# Patient Record
Sex: MALE | Race: WHITE | NOT HISPANIC OR LATINO | Employment: UNEMPLOYED | URBAN - NONMETROPOLITAN AREA
[De-identification: names, ages, dates, MRNs, and addresses within clinical notes are randomized per-mention and may not be internally consistent; named-entity substitution may affect disease eponyms.]

---
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
## 2024-02-16 ENCOUNTER — OFFICE VISIT (OUTPATIENT)
Age: 19
End: 2024-02-16
Payer: COMMERCIAL

## 2024-02-16 VITALS
TEMPERATURE: 97.5 F | WEIGHT: 230.7 LBS | HEART RATE: 73 BPM | OXYGEN SATURATION: 99 % | SYSTOLIC BLOOD PRESSURE: 130 MMHG | DIASTOLIC BLOOD PRESSURE: 90 MMHG | BODY MASS INDEX: 33.03 KG/M2 | HEIGHT: 70 IN

## 2024-02-16 DIAGNOSIS — Z23 IMMUNIZATION DUE: ICD-10-CM

## 2024-02-16 DIAGNOSIS — R03.0 ELEVATED BLOOD PRESSURE READING: ICD-10-CM

## 2024-02-16 DIAGNOSIS — F07.81 POST CONCUSSION SYNDROME: Primary | ICD-10-CM

## 2024-02-16 DIAGNOSIS — E66.09 CLASS 1 OBESITY DUE TO EXCESS CALORIES WITHOUT SERIOUS COMORBIDITY WITH BODY MASS INDEX (BMI) OF 32.0 TO 32.9 IN ADULT: ICD-10-CM

## 2024-02-16 PROBLEM — R51.9 HEADACHE: Status: ACTIVE | Noted: 2024-02-16

## 2024-02-16 PROBLEM — E66.811 CLASS 1 OBESITY DUE TO EXCESS CALORIES WITHOUT SERIOUS COMORBIDITY WITH BODY MASS INDEX (BMI) OF 32.0 TO 32.9 IN ADULT: Status: ACTIVE | Noted: 2024-02-16

## 2024-02-16 PROBLEM — R42 VERTIGO: Status: ACTIVE | Noted: 2022-11-22

## 2024-02-16 PROBLEM — M54.81 BILATERAL OCCIPITAL NEURALGIA: Status: ACTIVE | Noted: 2022-11-22

## 2024-02-16 PROBLEM — I95.1 PRIMARY ORTHOSTATIC HYPOTENSION: Status: ACTIVE | Noted: 2020-07-28

## 2024-02-16 PROBLEM — J45.901 ASTHMA EXACERBATION: Status: ACTIVE | Noted: 2024-02-16

## 2024-02-16 PROBLEM — M54.2 NECK PAIN: Status: ACTIVE | Noted: 2022-11-22

## 2024-02-16 PROBLEM — M54.50 ACUTE LUMBAR BACK PAIN: Status: ACTIVE | Noted: 2024-02-16

## 2024-02-16 PROBLEM — G43.909 MIGRAINE: Status: ACTIVE | Noted: 2022-11-22

## 2024-02-16 PROBLEM — F41.9 ANXIETY: Status: ACTIVE | Noted: 2020-07-28

## 2024-02-16 PROBLEM — G25.0 BENIGN ESSENTIAL TREMOR: Status: ACTIVE | Noted: 2020-07-27

## 2024-02-16 PROBLEM — R07.89 ATYPICAL CHEST PAIN: Status: ACTIVE | Noted: 2024-02-16

## 2024-02-16 RX ORDER — LORATADINE 10 MG/1
10 TABLET ORAL DAILY
COMMUNITY

## 2024-02-16 RX ORDER — DILTIAZEM HYDROCHLORIDE 60 MG/1
2 TABLET, FILM COATED ORAL
COMMUNITY
Start: 2023-11-02 | End: 2024-02-16

## 2024-02-16 RX ORDER — ALBUTEROL SULFATE 90 UG/1
AEROSOL, METERED RESPIRATORY (INHALATION) SEE ADMIN INSTRUCTIONS
COMMUNITY
Start: 2023-11-02 | End: 2024-02-16

## 2024-02-16 RX ORDER — AMITRIPTYLINE HYDROCHLORIDE 25 MG/1
25 TABLET, FILM COATED ORAL NIGHTLY
COMMUNITY
Start: 2023-11-14 | End: 2024-02-16

## 2024-02-16 RX ORDER — SUMATRIPTAN 100 MG/1
100 TABLET, FILM COATED ORAL
COMMUNITY
Start: 2023-11-14

## 2024-02-16 RX ORDER — FLUTICASONE PROPIONATE 50 MCG
SPRAY, SUSPENSION (ML) NASAL DAILY
COMMUNITY
End: 2024-02-16

## 2024-02-16 RX ORDER — DULOXETIN HYDROCHLORIDE 30 MG/1
30 CAPSULE, DELAYED RELEASE ORAL DAILY
COMMUNITY
Start: 2024-01-22 | End: 2025-01-21

## 2024-03-19 ENCOUNTER — OFFICE VISIT (OUTPATIENT)
Age: 19
End: 2024-03-19
Payer: COMMERCIAL

## 2024-03-19 VITALS
HEART RATE: 84 BPM | OXYGEN SATURATION: 97 % | TEMPERATURE: 97.5 F | SYSTOLIC BLOOD PRESSURE: 122 MMHG | DIASTOLIC BLOOD PRESSURE: 90 MMHG | BODY MASS INDEX: 32.37 KG/M2 | HEIGHT: 70 IN | WEIGHT: 226.1 LBS

## 2024-03-19 DIAGNOSIS — Z13.220 SCREENING FOR LIPID DISORDERS: ICD-10-CM

## 2024-03-19 DIAGNOSIS — Z11.59 NEED FOR HEPATITIS C SCREENING TEST: ICD-10-CM

## 2024-03-19 DIAGNOSIS — E55.9 VITAMIN D DEFICIENCY: ICD-10-CM

## 2024-03-19 DIAGNOSIS — Z13.0 SCREENING FOR DEFICIENCY ANEMIA: ICD-10-CM

## 2024-03-19 DIAGNOSIS — Z13.29 SCREENING FOR THYROID DISORDER: ICD-10-CM

## 2024-03-19 DIAGNOSIS — Z23 NEED FOR PNEUMOCOCCAL VACCINATION: ICD-10-CM

## 2024-03-19 DIAGNOSIS — E66.09 CLASS 1 OBESITY DUE TO EXCESS CALORIES WITHOUT SERIOUS COMORBIDITY WITH BODY MASS INDEX (BMI) OF 32.0 TO 32.9 IN ADULT: ICD-10-CM

## 2024-03-19 DIAGNOSIS — I10 PRIMARY HYPERTENSION: ICD-10-CM

## 2024-03-19 DIAGNOSIS — J45.40 MODERATE PERSISTENT ASTHMA WITHOUT COMPLICATION: ICD-10-CM

## 2024-03-19 DIAGNOSIS — Z00.00 WELLNESS EXAMINATION: Primary | ICD-10-CM

## 2024-03-19 DIAGNOSIS — R25.1 TREMOR: ICD-10-CM

## 2024-03-19 PROBLEM — J45.41 MODERATE PERSISTENT ASTHMA WITH EXACERBATION: Status: ACTIVE | Noted: 2024-02-16

## 2024-03-19 RX ORDER — DILTIAZEM HYDROCHLORIDE 60 MG/1
TABLET, FILM COATED ORAL
COMMUNITY
Start: 2024-03-15

## 2024-03-19 RX ORDER — PROPRANOLOL HYDROCHLORIDE 40 MG/1
40 TABLET ORAL 2 TIMES DAILY
Qty: 60 TABLET | Refills: 5 | Status: SHIPPED | OUTPATIENT
Start: 2024-03-19

## 2024-03-19 NOTE — PATIENT INSTRUCTIONS
Obesity, Adult  Obesity is the condition of having too much total body fat. Being overweight or obese means that your weight is greater than what is considered healthy for your body size. Obesity is determined by a measurement called BMI (body mass index). BMI is an estimate of body fat and is calculated from height and weight. For adults, a BMI of 30 or higher is considered obese.  Obesity can lead to other health concerns and major illnesses, including:  Stroke.  Coronary artery disease (CAD).  Type 2 diabetes.  Some types of cancer, including cancers of the colon, breast, uterus, and gallbladder.  High blood pressure (hypertension).  High cholesterol.  Gallbladder stones.  Obesity can also contribute to:  Osteoarthritis.  Sleep apnea.  Infertility problems.  What are the causes?  Common causes of this condition include:  Eating daily meals that are high in calories, sugar, and fat.  Drinking high amounts of sugar-sweetened beverages, such as soft drinks.  Being born with genes that may make you more likely to become obese.  Having a medical condition that causes obesity, including:  Hypothyroidism.  Polycystic ovarian syndrome (PCOS).  Binge-eating disorder.  Cushing syndrome.  Taking certain medicines, such as steroids, antidepressants, and seizure medicines.  Not being physically active (sedentary lifestyle).  Not getting enough sleep.  What increases the risk?  The following factors may make you more likely to develop this condition:  Having a family history of obesity.  Living in an area with limited access to:  [place], recreation centers, or sidewalks.  Healthy food choices, such as grocery stores and [employer] markets.  What are the signs or symptoms?  The main sign of this condition is having too much body fat.  How is this diagnosed?  This condition is diagnosed based on:  Your BMI. If you are an adult with a BMI of 30 or higher, you are considered obese.  Your waist circumference. This measures the  distance around your waistline.  Your skinfold thickness. Your health care provider may gently pinch a fold of your skin and measure it.  You may have other tests to check for underlying conditions.  How is this treated?  Treatment for this condition often includes changing your lifestyle. Treatment may include some or all of the following:  Dietary changes. This may include developing a healthy meal plan.  Regular physical activity. This may include activity that causes your heart to beat faster (aerobic exercise) and strength training. Work with your health care provider to design an exercise program that works for you.  Medicine to help you lose weight if you are unable to lose one pound a week after six weeks of healthy eating and more physical activity.  Treating conditions that cause the obesity (underlying conditions).  Surgery. Surgical options may include gastric banding and gastric bypass. Surgery may be done if:  Other treatments have not helped to improve your condition.  You have a BMI of 40 or higher.  You have life-threatening health problems related to obesity.  Follow these instructions at home:  Eating and drinking    Follow recommendations from your health care provider about what you eat and drink. Your health care provider may advise you to:  Limit fast food, sweets, and processed snack foods.  Choose low-fat options, such as low-fat milk instead of whole milk.  Eat five or more servings of fruits or vegetables every day.  Choose healthy foods when you eat out.  Keep low-fat snacks available.  Limit sugary drinks, such as soda, fruit juice, sweetened iced tea, and flavored milk.  Drink enough water to keep your urine pale yellow.  Do not follow a fad diet. Fad diets can be unhealthy and even dangerous.  Other healthful choices include:  Eat at home more often. This gives you more control over what you eat.  Learn to read food labels. This will help you understand how much food is considered one  serving.  Learn what a healthy serving size is.  Physical activity  Exercise regularly, as told by your health care provider.  Most adults should get up to 150 minutes of moderate-intensity exercise every week.  Ask your health care provider what types of exercise are safe for you and how often you should exercise.  Warm up and stretch before being active.  Cool down and stretch after being active.  Rest between periods of activity.  Lifestyle  Work with your health care provider and a dietitian to set a weight-loss goal that is healthy and reasonable for you.  Limit your screen time.  Find ways to reward yourself that do not involve food.  Do not drink alcohol if:  Your health care provider tells you not to drink.  You are pregnant, may be pregnant, or are planning to become pregnant.  If you drink alcohol:  Limit how much you have to:  0-1 drink a day for women.  0-2 drinks a day for men.  Know how much alcohol is in your drink. In the [place] one drink equals one 12 oz bottle of beer (355 mL), one 5 oz glass of wine (148 mL), or one 1½ oz glass of hard liquor (44 mL).  General instructions  Keep a weight-loss journal to keep track of the food you eat and how much exercise you get.  Take over-the-counter and prescription medicines only as told by your health care provider.  Take vitamins and supplements only as told by your health care provider.  Consider joining a support group. Your health care provider may be able to recommend a support group.  Pay attention to your mental health as obesity can lead to depression or self esteem issues.  Keep all follow-up visits. This is important.  Contact a health care provider if:  You are unable to meet your weight-loss goal after six weeks of dietary and lifestyle changes.  You have trouble breathing.  Summary  Obesity is the condition of having too much total body fat.  Being overweight or obese means that your weight is greater than what is considered healthy for your body  size.  Work with your health care provider and a dietitian to set a weight-loss goal that is healthy and reasonable for you.  Exercise regularly, as told by your health care provider. Ask your health care provider what types of exercise are safe for you and how often you should exercise.  This information is not intended to replace advice given to you by your health care provider. Make sure you discuss any questions you have with your health care provider.  Document Revised: 07/26/2022 Document Reviewed: 07/26/2022  [employer] Patient Education © 2023 [employer]  Managing Your Hypertension  Hypertension, also called high blood pressure, is when the force of the blood pressing against the walls of the arteries is too strong. Arteries are blood vessels that carry blood from your heart throughout your body. Hypertension forces the heart to work harder to pump blood and may cause the arteries to become narrow or stiff.  Understanding blood pressure readings  A blood pressure reading includes a higher number over a lower number:  The first, or top, number is called the systolic pressure. It is a measure of the pressure in your arteries as your heart beats.  The second, or bottom number, is called the diastolic pressure. It is a measure of the pressure in your arteries as the heart relaxes.  For most people, a normal blood pressure is below 120/80. Your personal target blood pressure may vary depending on your medical conditions, your age, and other factors.  Blood pressure is classified into four stages. Based on your blood pressure reading, your health care provider may use the following stages to determine what type of treatment you need, if any. Systolic pressure and diastolic pressure are measured in a unit called millimeters of mercury (mmHg).  Normal  Systolic pressure: below 120.  Diastolic pressure: below 80.  Elevated  Systolic pressure: 120-129.  Diastolic pressure: below 80.  Hypertension stage 1  Systolic  pressure: 130-139.  Diastolic pressure: 80-89.  Hypertension stage 2  Systolic pressure: 140 or above.  Diastolic pressure: 90 or above.  How can this condition affect me?  Managing your hypertension is very important. Over time, hypertension can damage the arteries and decrease blood flow to parts of the body, including the brain, heart, and kidneys. Having untreated or uncontrolled hypertension can lead to:  A heart attack.  A stroke.  A weakened blood vessel (aneurysm).  Heart failure.  Kidney damage.  Eye damage.  Memory and concentration problems.  Vascular dementia.  What actions can I take to manage this condition?  Hypertension can be managed by making lifestyle changes and possibly by taking medicines. Your health care provider will help you make a plan to bring your blood pressure within a normal range. You may be referred for counseling on a healthy diet and physical activity.  Nutrition    Eat a diet that is high in fiber and potassium, and low in salt (sodium), added sugar, and fat. An example eating plan is called the DASH diet. DASH stands for Dietary Approaches to Stop Hypertension. To eat this way:  Eat plenty of fresh fruits and vegetables. Try to fill one-half of your plate at each meal with fruits and vegetables.  Eat whole grains, such as whole-wheat pasta, brown rice, or whole-grain bread. Fill about one-fourth of your plate with whole grains.  Eat low-fat dairy products.  Avoid fatty cuts of meat, processed or cured meats, and poultry with skin. Fill about one-fourth of your plate with lean proteins such as fish, chicken without skin, beans, eggs, and tofu.  Avoid pre-made and processed foods. These tend to be higher in sodium, added sugar, and fat.  Reduce your daily sodium intake. Many people with hypertension should eat less than 1,500 mg of sodium a day.  Lifestyle    Work with your health care provider to maintain a healthy body weight or to lose weight. Ask what an ideal weight is for  you.  Get at least 30 minutes of exercise that causes your heart to beat faster (aerobic exercise) most days of the week. Activities may include walking, swimming, or biking.  Include exercise to strengthen your muscles (resistance exercise), such as weight lifting, as part of your weekly exercise routine. Try to do these types of exercises for 30 minutes at least 3 days a week.  Do not use any products that contain nicotine or tobacco. These products include cigarettes, chewing tobacco, and vaping devices, such as e-cigarettes. If you need help quitting, ask your health care provider.  Control any long-term (chronic) conditions you have, such as high cholesterol or diabetes.  Identify your sources of stress and find ways to manage stress. This may include meditation, deep breathing, or making time for fun activities.  Alcohol use  Do not drink alcohol if:  Your health care provider tells you not to drink.  You are pregnant, may be pregnant, or are planning to become pregnant.  If you drink alcohol:  Limit how much you have to:  0-1 drink a day for women.  0-2 drinks a day for men.  Know how much alcohol is in your drink. In the [place] one drink equals one 12 oz bottle of beer (355 mL), one 5 oz glass of wine (148 mL), or one 1½ oz glass of hard liquor (44 mL).  Medicines  Your health care provider may prescribe medicine if lifestyle changes are not enough to get your blood pressure under control and if:  Your systolic blood pressure is 130 or higher.  Your diastolic blood pressure is 80 or higher.  Take medicines only as told by your health care provider. Follow the directions carefully. Blood pressure medicines must be taken as told by your health care provider. The medicine does not work as well when you skip doses. Skipping doses also puts you at risk for problems.  Monitoring  Before you monitor your blood pressure:  Do not smoke, drink caffeinated beverages, or exercise within 30 minutes before taking a  measurement.  Use the bathroom and empty your bladder (urinate).  Sit quietly for at least 5 minutes before taking measurements.  Monitor your blood pressure at home as told by your health care provider. To do this:  Sit with your back straight and supported.  Place your feet flat on the floor. Do not cross your legs.  Support your arm on a flat surface, such as a table. Make sure your upper arm is at heart level.  Each time you measure, take two or three readings one minute apart and record the results.  You may also need to have your blood pressure checked regularly by your health care provider.  General information  Talk with your health care provider about your diet, exercise habits, and other lifestyle factors that may be contributing to hypertension.  Review all the medicines you take with your health care provider because there may be side effects or interactions.  Keep all follow-up visits. Your health care provider can help you create and adjust your plan for managing your high blood pressure.  Where to find more information  National Heart, Lung, and Blood [place]: www.nhlbi.nih.gov  American Heart Association: www.heart.org  Contact a health care provider if:  You think you are having a reaction to medicines you have taken.  You have repeated (recurrent) headaches.  You feel dizzy.  You have swelling in your ankles.  You have trouble with your vision.  Get help right away if:  You develop a severe headache or confusion.  You have unusual weakness or numbness, or you feel faint.  You have severe pain in your chest or abdomen.  You vomit repeatedly.  You have trouble breathing.  These symptoms may be an emergency. Get help right away. Call 911.  Do not wait to see if the symptoms will go away.  Do not drive yourself to the hospital.  Summary  Hypertension is when the force of blood pumping through your arteries is too strong. If this condition is not controlled, it may put you at risk for serious  "complications.  Your personal target blood pressure may vary depending on your medical conditions, your age, and other factors. For most people, a normal blood pressure is less than 120/80.  Hypertension is managed by lifestyle changes, medicines, or both.  Lifestyle changes to help manage hypertension include losing weight, eating a healthy, low-sodium diet, exercising more, stopping smoking, and limiting alcohol.  This information is not intended to replace advice given to you by your health care provider. Make sure you discuss any questions you have with your health care provider.  Document Revised: 09/01/2022 Document Reviewed: 09/01/2022  [employer] Patient Education © 2023 [employer]  DASH Eating Plan  DASH stands for Dietary Approaches to Stop Hypertension. The DASH eating plan is a healthy eating plan that has been shown to:  Reduce high blood pressure (hypertension).  Reduce your risk for type 2 diabetes, heart disease, and stroke.  Help with weight loss.  What are tips for following this plan?  Reading food labels  Check food labels for the amount of salt (sodium) per serving. Choose foods with less than 5 percent of the Daily Value of sodium. Generally, foods with less than 300 milligrams (mg) of sodium per serving fit into this eating plan.  To find whole grains, look for the word \"whole\" as the first word in the ingredient list.  Shopping  Buy products labeled as \"low-sodium\" or \"no salt added.\"  Buy fresh foods. Avoid canned foods and pre-made or frozen meals.  Cooking  Avoid adding salt when cooking. Use salt-free seasonings or herbs instead of table salt or sea salt. Check with your health care provider or pharmacist before using salt substitutes.  Do not santiago foods. Cook foods using healthy methods such as baking, boiling, grilling, roasting, and broiling instead.  Cook with heart-healthy oils, such as olive, canola, avocado, soybean, or sunflower oil.  Meal planning    Eat a balanced diet that " includes:  4 or more servings of fruits and 4 or more servings of vegetables each day. Try to fill one-half of your plate with fruits and vegetables.  6-8 servings of whole grains each day.  Less than 6 oz (170 g) of lean meat, poultry, or fish each day. A 3-oz (85-g) serving of meat is about the same size as a deck of cards. One egg equals 1 oz (28 g).  2-3 servings of low-fat dairy each day. One serving is 1 cup (237 mL).  1 serving of nuts, seeds, or beans 5 times each week.  2-3 servings of heart-healthy fats. Healthy fats called omega-3 fatty acids are found in foods such as walnuts, flaxseeds, fortified milks, and eggs. These fats are also found in cold-water fish, such as sardines, salmon, and mackerel.  Limit how much you eat of:  Canned or prepackaged foods.  Food that is high in trans fat, such as some fried foods.  Food that is high in saturated fat, such as fatty meat.  Desserts and other sweets, sugary drinks, and other foods with added sugar.  Full-fat dairy products.  Do not salt foods before eating.  Do not eat more than 4 egg yolks a week.  Try to eat at least 2 vegetarian meals a week.  Eat more home-cooked food and less restaurant, buffet, and fast food.  Lifestyle  When eating at a restaurant, ask that your food be prepared with less salt or no salt, if possible.  If you drink alcohol:  Limit how much you use to:  0-1 drink a day for women who are not pregnant.  0-2 drinks a day for men.  Be aware of how much alcohol is in your drink. In the [place] one drink equals one 12 oz bottle of beer (355 mL), one 5 oz glass of wine (148 mL), or one 1½ oz glass of hard liquor (44 mL).  General information  Avoid eating more than 2,300 mg of salt a day. If you have hypertension, you may need to reduce your sodium intake to 1,500 mg a day.  Work with your health care provider to maintain a healthy body weight or to lose weight. Ask what an ideal weight is for you.  Get at least 30 minutes of exercise that  causes your heart to beat faster (aerobic exercise) most days of the week. Activities may include walking, swimming, or biking.  Work with your health care provider or dietitian to adjust your eating plan to your individual calorie needs.  What foods should I eat?  Fruits  All fresh, dried, or frozen fruit. Canned fruit in natural juice (without added sugar).  Vegetables  Fresh or frozen vegetables (raw, steamed, roasted, or grilled). Low-sodium or reduced-sodium tomato and vegetable juice. Low-sodium or reduced-sodium tomato sauce and tomato paste. Low-sodium or reduced-sodium canned vegetables.  Grains  Whole-grain or whole-wheat bread. Whole-grain or whole-wheat pasta. Brown rice. Oatmeal. Quinoa. Bulgur. Whole-grain and low-sodium cereals. [place] bread. Low-fat, low-sodium crackers. Whole-wheat flour tortillas.  Meats and other proteins  Skinless chicken or turkey. Ground chicken or turkey. Pork with fat trimmed off. Fish and seafood. Egg whites. Dried beans, peas, or lentils. Unsalted nuts, nut butters, and seeds. Unsalted canned beans. Lean cuts of beef with fat trimmed off. Low-sodium, lean precooked or cured meat, such as sausages or meat loaves.  Dairy  Low-fat (1%) or fat-free (skim) milk. Reduced-fat, low-fat, or fat-free cheeses. Nonfat, low-sodium ricotta or cottage cheese. Low-fat or nonfat yogurt. Low-fat, low-sodium cheese.  Fats and oils  Soft margarine without trans fats. Vegetable oil. Reduced-fat, low-fat, or light mayonnaise and salad dressings (reduced-sodium). Canola, safflower, olive, avocado, soybean, and sunflower oils. Avocado.  Seasonings and condiments  Herbs. Spices. Seasoning mixes without salt.  Other foods  Unsalted popcorn and pretzels. Fat-free sweets.  The items listed above may not be a complete list of foods and beverages you can eat. Contact a dietitian for more information.  What foods should I avoid?  Fruits  Canned fruit in a light or heavy syrup. Fried fruit. Fruit in cream  or butter sauce.  Vegetables  Creamed or fried vegetables. Vegetables in a cheese sauce. Regular canned vegetables (not low-sodium or reduced-sodium). Regular canned tomato sauce and paste (not low-sodium or reduced-sodium). Regular tomato and vegetable juice (not low-sodium or reduced-sodium). Pickles. Olives.  Grains  Baked goods made with fat, such as croissants, muffins, or some breads. Dry pasta or rice meal packs.  Meats and other proteins  Fatty cuts of meat. Ribs. Fried meat. James. Bologna, salami, and other precooked or cured meats, such as sausages or meat loaves. Fat from the back of a pig (fatback). Bratwurst. Salted nuts and seeds. Canned beans with added salt. Canned or smoked fish. Whole eggs or egg yolks. Chicken or turkey with skin.  Dairy  Whole or 2% milk, cream, and half-and-half. Whole or full-fat cream cheese. Whole-fat or sweetened yogurt. Full-fat cheese. Nondairy creamers. Whipped toppings. Processed cheese and cheese spreads.  Fats and oils  Butter. Stick margarine. Lard. Shortening. Ghee. James fat. Tropical oils, such as coconut, palm kernel, or palm oil.  Seasonings and condiments  Onion salt, garlic salt, seasoned salt, table salt, and sea salt. [facility] sauce. Tartar sauce. Barbecue sauce. Teriyaki sauce. Soy sauce, including reduced-sodium. Steak sauce. Canned and packaged gravies. Fish sauce. Oyster sauce. Cocktail sauce. Store-bought horseradish. Ketchup. Mustard. Meat flavorings and tenderizers. Bouillon cubes. Hot sauces. Pre-made or packaged marinades. Pre-made or packaged taco seasonings. Relishes. Regular salad dressings.  Other foods  Salted popcorn and pretzels.  The items listed above may not be a complete list of foods and beverages you should avoid. Contact a dietitian for more information.  Where to find more information  National Heart, Lung, and Blood [place]: www.nhlbi.nih.gov  American Heart Association: www.heart.org  Academy of Nutrition and Dietetics:  www.eatright.org  National Kidney Foundation: www.kidney.org  Summary  The DASH eating plan is a healthy eating plan that has been shown to reduce high blood pressure (hypertension). It may also reduce your risk for type 2 diabetes, heart disease, and stroke.  When on the DASH eating plan, aim to eat more fresh fruits and vegetables, whole grains, lean proteins, low-fat dairy, and heart-healthy fats.  With the DASH eating plan, you should limit salt (sodium) intake to 2,300 mg a day. If you have hypertension, you may need to reduce your sodium intake to 1,500 mg a day.  Work with your health care provider or dietitian to adjust your eating plan to your individual calorie needs.  This information is not intended to replace advice given to you by your health care provider. Make sure you discuss any questions you have with your health care provider.  Document Revised: 11/20/2020 Document Reviewed: 11/20/2020  [employer] Patient Education © 2023 [employer]

## 2024-03-19 NOTE — PROGRESS NOTES
Patient Care Team:  Bandar Alva APRN as PCP - General (Family Medicine)     Chief complaint: Patient is in today for a physical     Patient is a 19 y.o. male who presents for his yearly physical exam.     HPI patient today for routine yearly physical exam and to follow-up on elevated blood pressure.  Last visit about a month ago his blood pressure was running 130/90.  We decided to try diet and exercise to see if can improve this blood pressure without medication at that time.  His weight is down 4 pounds from last visit.  He does report there is a strong family history of high blood pressure. His neurologist was interested in starting him on propranolol for HTN and tremor. He is scheduled for MRI in March 28.     Review of Systems   Constitutional:  Positive for fatigue. Negative for appetite change, chills and fever.   HENT:  Negative for congestion, ear pain, hearing loss, rhinorrhea, sinus pressure and sore throat.    Eyes:  Negative for itching and visual disturbance.   Respiratory:  Positive for shortness of breath and wheezing. Negative for cough.         Asthma   Cardiovascular:  Negative for chest pain, palpitations and leg swelling.   Gastrointestinal:  Negative for abdominal pain, blood in stool, constipation, diarrhea, nausea and vomiting.   Endocrine: Negative for cold intolerance, heat intolerance, polydipsia, polyphagia and polyuria.   Genitourinary:  Negative for difficulty urinating, dysuria and hematuria.   Musculoskeletal:  Positive for neck pain. Negative for arthralgias, back pain, joint swelling and myalgias.   Skin:  Negative for rash and wound.   Allergic/Immunologic: Negative for environmental allergies and food allergies.   Neurological:  Positive for tremors, light-headedness, numbness and headaches. Negative for dizziness.   Hematological:  Negative for adenopathy. Does not bruise/bleed easily.   Psychiatric/Behavioral:  Positive for sleep disturbance. Negative for dysphoric  mood. The patient is not nervous/anxious.       History  Past Medical History:   Diagnosis Date    Acid reflux     Allergic     Asthma     Migraines       Past Surgical History:   Procedure Laterality Date    HERNIA REPAIR        No Known Allergies   Family History   Problem Relation Age of Onset    Cancer Mother         thyroid and cervical    Obesity Mother      Social History     Socioeconomic History    Marital status: Single   Tobacco Use    Smoking status: Never    Smokeless tobacco: Never   Vaping Use    Vaping status: Never Used   Substance and Sexual Activity    Alcohol use: Never    Drug use: Never    Sexual activity: Never        Current Outpatient Medications:     DULoxetine (CYMBALTA) 30 MG capsule, Take 1 capsule by mouth Daily., Disp: , Rfl:     loratadine (CLARITIN) 10 MG tablet, Take 6 tablets by mouth Daily., Disp: , Rfl:     SUMAtriptan (IMITREX) 100 MG tablet, Take 1 tablet by mouth., Disp: , Rfl:     Symbicort 80-4.5 MCG/ACT inhaler, INHALE ONE PUFF BY MOUTH TWO TIMES A DAY, Disp: , Rfl:     propranolol (INDERAL) 40 MG tablet, Take 1 tablet by mouth 2 (Two) Times a Day., Disp: 60 tablet, Rfl: 5    Immunizations   N/A   Prescribed/Refused   Date     Td/Tdap  (Booster Q 10 yrs)   [x]           Prescribed    []     Refused        []           Flu  (Yearly)   [x]        Prescribed    []     Refused        []           Pneumonia      []        Prescribed    [x]     Refused        []                 Hep B     [x]        Prescribed    []     Refused        []           Shingles  (Age 50 and older)   [x]        Prescribed    []     Refused        []           Immunization History   Administered Date(s) Administered    31-influenza Vac Quardvalent Preservativ 09/23/2015, 10/10/2016    COVID-19 (PFIZER) Purple Cap Monovalent 07/02/2021    DTaP, Unspecified 03/13/2009    Fluzone (or Fluarix & Flulaval for VFC) >6mos 10/10/2016, 02/06/2018, 02/16/2024    Hep A, 2 Dose 10/17/2016, 02/06/2018    Hpv9  "10/17/2016, 02/06/2018    IPV 03/13/2009    Influenza LAIV (Nasal) 10/27/2011    Influenza Seasonal Injectable 03/13/2009    MCV4 Unspecified 07/06/2015    MMR 03/13/2009    Meningococcal MCV4P (Menactra) 04/16/2020    Pneumococcal Conjugate 20-Valent (PCV20) 03/19/2024    Tdap 07/06/2015    Varicella 07/06/2015     Health Maintenance   Topic Date Due    HEPATITIS C SCREENING  Never done    COVID-19 Vaccine (2 - 2023-24 season) 04/02/2024 (Originally 9/1/2023)    ANNUAL PHYSICAL  03/19/2025    BMI FOLLOWUP  03/19/2025    TDAP/TD VACCINES (2 - Td or Tdap) 07/06/2025    Pneumococcal Vaccine 0-64  Completed    INFLUENZA VACCINE  Completed    MENINGOCOCCAL VACCINE  Completed    HPV VACCINES  Completed        Diabetes  [] Yes  [x] No   N/A      Date     Eye Exam     []             []   Complete     []   Recommended Date:  Where:       Foot Exam     []         []   Complete          Obesity Counseling     []       []   Complete     No results found for: \"HGBA1C\", \"MICROALBUR\"    Additional Testing      Date     Colorectal Screening       [x]   N/A   []   Complete    []   Ordered     Date:    Where:       Pap      [x]   N/A   []   Complete   []   OB/GYN Date:    Where:       Mammogram        [x]   N/A   []   Complete   []  OB/GYN   []   Ordered Date:    Where:     PSA  (Over age 50)    [x]   N/A   []   Complete   []   Ordered Date:    Where:     US Aorta  (For male smokers, age 65)     [x]   N/A   []   Complete   []   Ordered Date:    Where:     CT for Smoker  (Age 55-75, 30 pk yr)    [x]   N/A   []   Complete   []   Ordered Date:    Where:     Bone Density/DEXA      [x]   N/A   []   Complete   []   Ordered Date:    Follow-up:     Hep. C        []   N/A   []   Complete   [x]   Ordered Date:    Where:   No results found for this or any previous visit.         /90 (BP Location: Right arm, Patient Position: Sitting, Cuff Size: Large Adult)   Pulse 84   Temp 97.5 °F (36.4 °C) (Temporal)   Ht 178.4 cm (70.24\")   Wt " 103 kg (226 lb 1.6 oz)   SpO2 97%   BMI 32.22 kg/m²       Physical Exam  Vitals and nursing note reviewed.   Constitutional:       General: He is not in acute distress.     Appearance: Normal appearance. He is well-developed. He is not diaphoretic.   HENT:      Head: Normocephalic and atraumatic.      Right Ear: External ear normal.      Left Ear: External ear normal.      Nose: Nose normal.   Eyes:      General: No scleral icterus.        Right eye: No discharge.         Left eye: No discharge.      Conjunctiva/sclera: Conjunctivae normal.      Pupils: Pupils are equal, round, and reactive to light.   Neck:      Thyroid: No thyromegaly.      Vascular: No JVD (no bruits).      Trachea: No tracheal deviation.   Cardiovascular:      Rate and Rhythm: Normal rate and regular rhythm.      Heart sounds: No murmur heard.     No friction rub. No gallop.   Pulmonary:      Effort: Pulmonary effort is normal. No respiratory distress.      Breath sounds: Normal breath sounds. No wheezing or rales.   Chest:      Chest wall: No tenderness.   Abdominal:      General: Bowel sounds are normal. There is no distension.      Palpations: Abdomen is soft. There is no mass.      Tenderness: There is no abdominal tenderness. There is no guarding or rebound.      Hernia: No hernia is present.   Genitourinary:     Comments: deferred  Musculoskeletal:         General: No tenderness or deformity. Normal range of motion.      Cervical back: Normal range of motion and neck supple.   Lymphadenopathy:      Cervical: No cervical adenopathy.   Skin:     General: Skin is warm and dry.      Coloration: Skin is not pale.      Findings: No erythema or rash.   Neurological:      Mental Status: He is alert and oriented to person, place, and time.      Motor: No abnormal muscle tone.      Deep Tendon Reflexes: Reflexes are normal and symmetric. Reflexes normal.   Psychiatric:         Behavior: Behavior normal.         Thought Content: Thought content  normal.         Judgment: Judgment normal.             Counseling provided on weight management and hypertension.    Diagnoses and all orders for this visit:    Wellness examination  -     Comprehensive Metabolic Panel; Future  -     Lipid Panel; Future  -     TSH Rfx On Abnormal To Free T4; Future  -     CBC (No Diff); Future  -     Hepatitis C Antibody; Future    Class 1 obesity due to excess calories without serious comorbidity with body mass index (BMI) of 32.0 to 32.9 in adult  -     Comprehensive Metabolic Panel; Future    Primary hypertension  -     Comprehensive Metabolic Panel; Future  -     propranolol (INDERAL) 40 MG tablet; Take 1 tablet by mouth 2 (Two) Times a Day.    Screening for lipid disorders  -     Lipid Panel; Future    Vitamin D deficiency  -     Vitamin D,25-Hydroxy; Future    Screening for thyroid disorder  -     TSH Rfx On Abnormal To Free T4; Future    Screening for deficiency anemia  -     CBC (No Diff); Future    Need for hepatitis C screening test  -     Hepatitis C Antibody; Future    Tremor  -     propranolol (INDERAL) 40 MG tablet; Take 1 tablet by mouth 2 (Two) Times a Day.    Need for pneumococcal vaccination  -     Pneumococcal Conjugate Vaccine 20-Valent All    Moderate persistent asthma without complication  -     Symbicort 80-4.5 MCG/ACT inhaler; INHALE ONE PUFF BY MOUTH TWO TIMES A DAY    This is a health maintenance visit, for a patient age under age 65. Counseling/Anticipatory Guidance for nutrition, family planning/contraception, physical activity, healthy weight, and injury prevention, misuse of tobacco, alcohol and drugs, sexual behavior and STDs, dental health, mental health, immunizations, screenings For Women: Breast cancer and exams. For Men: testicular exams. All discussed.      Patient instructed to check blood pressure daily, record, and bring to next visit. If the readings run 140/90 or greater, the patient is to call my office or return sooner for evaluation. Pt  advised to eat a heart healthy diet and get regular aerobic exercise.    Will obtain routine labs today and make further recommendations pending results. All lab results are automatically released to [facility] immediately when they return whether they have been reviewed or not. The patient will be notified about the results, regardless of the findings. If they have not been contacted by the office within 2 weeks after the test has been performed, I want them to contact us to learn about the results.    Discussed need for weight management.  I recommend they use a weight loss jose david on their phone, eat no more than [id number] earline/day, and exercise 30 to 60 minutes 3 times a week.  Discussed weight loss with diet, recommend Weight Watchers or Mediterranean Diet, but stated that whatever method works for this patient is best. The patient agrees with all recommendations at this time. I have discussed a weight loss plan to be determined by this patient.     RV- 1 month      Bandar Alva, APRN   3/19/2024   16:05 EDT          Please note that portions of this document were completed with a voice recognition program.     At [facility], we believe that sharing information builds trust and better relationships. You are receiving this note because you are receiving care at [facility] or have recently visited. It is possible you will see health information before a provider has talked with you about it. This kind of information can be easy to misunderstand. To help you fully understand what it means for your health, we urge you to discuss this note with your provider.

## 2024-03-19 NOTE — LETTER
[facility]  Vaccine Consent Form    Patient Name:  Polo Mcguire  Patient :  2005     Vaccine(s) Ordered    Pneumococcal Conjugate Vaccine 20-Valent All        Screening Checklist  The following questions should be completed prior to vaccination. If you answer “yes” to any question, it does not necessarily mean you should not be vaccinated. It just means we may need to clarify or ask more questions. If a question is unclear, please ask your healthcare provider to explain it.    Yes No   Any fever or moderate to severe illness today (mild illness and/or antibiotic treatment are not contraindications)?     Do you have a history of a serious reaction to any previous vaccinations, such as anaphylaxis, encephalopathy within 7 days, Guillain-Salem syndrome within 6 weeks, seizure?     Have you received any live vaccine(s) (e.g MMR, PEREZ) or any other vaccines in the last month (to ensure duplicate doses aren't given)?     Do you have an anaphylactic allergy to latex (DTaP, DTaP-IPV, Hep A, Hep B, MenB, RV, Td, Tdap), baker’s yeast (Hep B, HPV), polysorbates (RSV, nirsevimab, PCV 20, Rotavirrus, Tdap, Shingrix), or gelatin (PEREZ, MMR)?     Do you have an anaphylactic allergy to neomycin (Rabies, PEREZ, MMR, IPV, Hep A), polymyxin B (IPV), or streptomycin (IPV)?      Any cancer, leukemia, AIDS, or other immune system disorder? (PEREZ, MMR, RV)     Do you have a parent, brother, or sister with an immune system problem (if immune competence of vaccine recipient clinically verified, can proceed)? (MMR, PEREZ)     Any recent steroid treatments for >2 weeks, chemotherapy, or radiation treatment? (PEREZ, MMR)     Have you received antibody-containing blood transfusions or IVIG in the past 11 months (recommended interval is dependent on product)? (MMR, PEREZ)     Have you taken antiviral drugs (acyclovir, famciclovir, valacyclovir for PEREZ) in the last 24 or 48 hours, respectively?      Are you pregnant or planning to become  "pregnant within 1 month? (PEREZ, MMR, HPV, IPV, MenB, Abrexvy; For Hep B- refer to Engerix-B; For RSV - Abrysvo is indicated for 32-36 weeks of pregnancy from September to January)     For infants, have you ever been told your child has had intussusception or a medical emergency involving obstruction of the intestine (Rotavirus)? If not for an infant, can skip this question.         *Ordering Physicians/APC should be consulted if \"yes\" is checked by the patient or guardian above.  I have received, read, and understand the Vaccine Information Statement (VIS) for each vaccine ordered.  I have considered my or my child's health status as well as the health status of my close contacts.  I have taken the opportunity to discuss my vaccine questions with my or my child's health care provider.   I have requested that the ordered vaccine(s) be given to me or my child.  I understand the benefits and risks of the vaccines.  I understand that I should remain in the clinic for 15 minutes after receiving the vaccine(s).  _________________________________________________________  Signature of Patient or Parent/Legal Guardian ____________________  Date     "

## 2024-04-23 ENCOUNTER — OFFICE VISIT (OUTPATIENT)
Age: 19
End: 2024-04-23
Payer: COMMERCIAL

## 2024-04-23 VITALS
HEIGHT: 70 IN | OXYGEN SATURATION: 98 % | BODY MASS INDEX: 33.46 KG/M2 | WEIGHT: 233.7 LBS | DIASTOLIC BLOOD PRESSURE: 88 MMHG | TEMPERATURE: 97.5 F | SYSTOLIC BLOOD PRESSURE: 128 MMHG | HEART RATE: 82 BPM

## 2024-04-23 DIAGNOSIS — J30.2 SEASONAL ALLERGIC RHINITIS, UNSPECIFIED TRIGGER: ICD-10-CM

## 2024-04-23 DIAGNOSIS — E66.09 CLASS 1 OBESITY DUE TO EXCESS CALORIES WITHOUT SERIOUS COMORBIDITY WITH BODY MASS INDEX (BMI) OF 32.0 TO 32.9 IN ADULT: ICD-10-CM

## 2024-04-23 DIAGNOSIS — F41.9 ANXIETY: ICD-10-CM

## 2024-04-23 DIAGNOSIS — F07.81 POST CONCUSSION SYNDROME: ICD-10-CM

## 2024-04-23 DIAGNOSIS — R51.9 NONINTRACTABLE HEADACHE, UNSPECIFIED CHRONICITY PATTERN, UNSPECIFIED HEADACHE TYPE: Primary | ICD-10-CM

## 2024-04-23 DIAGNOSIS — I10 PRIMARY HYPERTENSION: ICD-10-CM

## 2024-04-23 DIAGNOSIS — J45.40 MODERATE PERSISTENT ASTHMA WITHOUT COMPLICATION: ICD-10-CM

## 2024-04-23 PROCEDURE — 1159F MED LIST DOCD IN RCRD: CPT | Performed by: NURSE PRACTITIONER

## 2024-04-23 PROCEDURE — 3074F SYST BP LT 130 MM HG: CPT | Performed by: NURSE PRACTITIONER

## 2024-04-23 PROCEDURE — 99214 OFFICE O/P EST MOD 30 MIN: CPT | Performed by: NURSE PRACTITIONER

## 2024-04-23 PROCEDURE — 3079F DIAST BP 80-89 MM HG: CPT | Performed by: NURSE PRACTITIONER

## 2024-04-23 PROCEDURE — 1160F RVW MEDS BY RX/DR IN RCRD: CPT | Performed by: NURSE PRACTITIONER

## 2024-04-23 RX ORDER — LORATADINE 10 MG/1
10 TABLET ORAL DAILY
Qty: 90 TABLET | Refills: 3 | Status: SHIPPED | OUTPATIENT
Start: 2024-04-23

## 2024-04-23 RX ORDER — FLUTICASONE PROPIONATE 50 MCG
2 SPRAY, SUSPENSION (ML) NASAL DAILY
Qty: 18.2 ML | Refills: 11 | Status: SHIPPED | OUTPATIENT
Start: 2024-04-23

## 2024-04-23 RX ORDER — ERENUMAB-AOOE 70 MG/ML
70 INJECTION SUBCUTANEOUS
COMMUNITY
Start: 2024-04-15 | End: 2025-04-15

## 2024-04-23 RX ORDER — MONTELUKAST SODIUM 10 MG/1
10 TABLET ORAL NIGHTLY
Qty: 90 TABLET | Refills: 1 | Status: SHIPPED | OUTPATIENT
Start: 2024-04-23

## 2024-04-23 NOTE — PATIENT INSTRUCTIONS
Managing Your Hypertension  Hypertension, also called high blood pressure, is when the force of the blood pressing against the walls of the arteries is too strong. Arteries are blood vessels that carry blood from your heart throughout your body. Hypertension forces the heart to work harder to pump blood and may cause the arteries to become narrow or stiff.  Understanding blood pressure readings  A blood pressure reading includes a higher number over a lower number:  The first, or top, number is called the systolic pressure. It is a measure of the pressure in your arteries as your heart beats.  The second, or bottom number, is called the diastolic pressure. It is a measure of the pressure in your arteries as the heart relaxes.  For most people, a normal blood pressure is below 120/80. Your personal target blood pressure may vary depending on your medical conditions, your age, and other factors.  Blood pressure is classified into four stages. Based on your blood pressure reading, your health care provider may use the following stages to determine what type of treatment you need, if any. Systolic pressure and diastolic pressure are measured in a unit called millimeters of mercury (mmHg).  Normal  Systolic pressure: below 120.  Diastolic pressure: below 80.  Elevated  Systolic pressure: 120-129.  Diastolic pressure: below 80.  Hypertension stage 1  Systolic pressure: 130-139.  Diastolic pressure: 80-89.  Hypertension stage 2  Systolic pressure: 140 or above.  Diastolic pressure: 90 or above.  How can this condition affect me?  Managing your hypertension is very important. Over time, hypertension can damage the arteries and decrease blood flow to parts of the body, including the brain, heart, and kidneys. Having untreated or uncontrolled hypertension can lead to:  A heart attack.  A stroke.  A weakened blood vessel (aneurysm).  Heart failure.  Kidney damage.  Eye damage.  Memory and concentration problems.  Vascular  dementia.  What actions can I take to manage this condition?  Hypertension can be managed by making lifestyle changes and possibly by taking medicines. Your health care provider will help you make a plan to bring your blood pressure within a normal range. You may be referred for counseling on a healthy diet and physical activity.  Nutrition    Eat a diet that is high in fiber and potassium, and low in salt (sodium), added sugar, and fat. An example eating plan is called the DASH diet. DASH stands for Dietary Approaches to Stop Hypertension. To eat this way:  Eat plenty of fresh fruits and vegetables. Try to fill one-half of your plate at each meal with fruits and vegetables.  Eat whole grains, such as whole-wheat pasta, brown rice, or whole-grain bread. Fill about one-fourth of your plate with whole grains.  Eat low-fat dairy products.  Avoid fatty cuts of meat, processed or cured meats, and poultry with skin. Fill about one-fourth of your plate with lean proteins such as fish, chicken without skin, beans, eggs, and tofu.  Avoid pre-made and processed foods. These tend to be higher in sodium, added sugar, and fat.  Reduce your daily sodium intake. Many people with hypertension should eat less than 1,500 mg of sodium a day.  Lifestyle    Work with your health care provider to maintain a healthy body weight or to lose weight. Ask what an ideal weight is for you.  Get at least 30 minutes of exercise that causes your heart to beat faster (aerobic exercise) most days of the week. Activities may include walking, swimming, or biking.  Include exercise to strengthen your muscles (resistance exercise), such as weight lifting, as part of your weekly exercise routine. Try to do these types of exercises for 30 minutes at least 3 days a week.  Do not use any products that contain nicotine or tobacco. These products include cigarettes, chewing tobacco, and vaping devices, such as e-cigarettes. If you need help quitting, ask your  health care provider.  Control any long-term (chronic) conditions you have, such as high cholesterol or diabetes.  Identify your sources of stress and find ways to manage stress. This may include meditation, deep breathing, or making time for fun activities.  Alcohol use  Do not drink alcohol if:  Your health care provider tells you not to drink.  You are pregnant, may be pregnant, or are planning to become pregnant.  If you drink alcohol:  Limit how much you have to:  0-1 drink a day for women.  0-2 drinks a day for men.  Know how much alcohol is in your drink. In the [place] one drink equals one 12 oz bottle of beer (355 mL), one 5 oz glass of wine (148 mL), or one 1½ oz glass of hard liquor (44 mL).  Medicines  Your health care provider may prescribe medicine if lifestyle changes are not enough to get your blood pressure under control and if:  Your systolic blood pressure is 130 or higher.  Your diastolic blood pressure is 80 or higher.  Take medicines only as told by your health care provider. Follow the directions carefully. Blood pressure medicines must be taken as told by your health care provider. The medicine does not work as well when you skip doses. Skipping doses also puts you at risk for problems.  Monitoring  Before you monitor your blood pressure:  Do not smoke, drink caffeinated beverages, or exercise within 30 minutes before taking a measurement.  Use the bathroom and empty your bladder (urinate).  Sit quietly for at least 5 minutes before taking measurements.  Monitor your blood pressure at home as told by your health care provider. To do this:  Sit with your back straight and supported.  Place your feet flat on the floor. Do not cross your legs.  Support your arm on a flat surface, such as a table. Make sure your upper arm is at heart level.  Each time you measure, take two or three readings one minute apart and record the results.  You may also need to have your blood pressure checked regularly by  your health care provider.  General information  Talk with your health care provider about your diet, exercise habits, and other lifestyle factors that may be contributing to hypertension.  Review all the medicines you take with your health care provider because there may be side effects or interactions.  Keep all follow-up visits. Your health care provider can help you create and adjust your plan for managing your high blood pressure.  Where to find more information  National Heart, Lung, and Blood [place]: www.nhlbi.nih.gov  American Heart Association: www.heart.org  Contact a health care provider if:  You think you are having a reaction to medicines you have taken.  You have repeated (recurrent) headaches.  You feel dizzy.  You have swelling in your ankles.  You have trouble with your vision.  Get help right away if:  You develop a severe headache or confusion.  You have unusual weakness or numbness, or you feel faint.  You have severe pain in your chest or abdomen.  You vomit repeatedly.  You have trouble breathing.  These symptoms may be an emergency. Get help right away. Call 911.  Do not wait to see if the symptoms will go away.  Do not drive yourself to the hospital.  Summary  Hypertension is when the force of blood pumping through your arteries is too strong. If this condition is not controlled, it may put you at risk for serious complications.  Your personal target blood pressure may vary depending on your medical conditions, your age, and other factors. For most people, a normal blood pressure is less than 120/80.  Hypertension is managed by lifestyle changes, medicines, or both.  Lifestyle changes to help manage hypertension include losing weight, eating a healthy, low-sodium diet, exercising more, stopping smoking, and limiting alcohol.  This information is not intended to replace advice given to you by your health care provider. Make sure you discuss any questions you have with your health care  "provider.  Document Revised: 09/01/2022 Document Reviewed: 09/01/2022  [employer] Patient Education © 2024 [employer]  DASH Eating Plan  DASH stands for Dietary Approaches to Stop Hypertension. The DASH eating plan is a healthy eating plan that has been shown to:  Lower high blood pressure (hypertension).  Reduce your risk for type 2 diabetes, heart disease, and stroke.  Help with weight loss.  What are tips for following this plan?  Reading food labels  Check food labels for the amount of salt (sodium) per serving. Choose foods with less than 5 percent of the Daily Value (DV) of sodium. In general, foods with less than 300 milligrams (mg) of sodium per serving fit into this eating plan.  To find whole grains, look for the word \"whole\" as the first word in the ingredient list.  Shopping  Buy products labeled as \"low-sodium\" or \"no salt added.\"  Buy fresh foods. Avoid canned foods and pre-made or frozen meals.  Cooking  Try not to add salt when you cook. Use salt-free seasonings or herbs instead of table salt or sea salt. Check with your health care provider or pharmacist before using salt substitutes.  Do not [place]antiago foods. Cook foods in healthy ways, such as baking, boiling, grilling, roasting, or broiling.  Cook using oils that are good for your heart. These include olive, canola, avocado, soybean, and sunflower oil.  Meal planning    Eat a balanced diet. This should include:  4 or more servings of fruits and 4 or more servings of vegetables each day. Try to fill half of your plate with fruits and vegetables.  6-8 servings of whole grains each day.  6 or less servings of lean meat, poultry, or fish each day. 1 oz is 1 serving. A 3 oz (85 g) serving of meat is about the same size as the palm of your hand. One egg is 1 oz (28 g).  2-3 servings of low-fat dairy each day. One serving is 1 cup (237 mL).  1 serving of nuts, seeds, or beans 5 times each week.  2-3 servings of heart-healthy fats. Healthy fats called omega-3 " fatty acids are found in foods such as walnuts, flaxseeds, fortified milks, and eggs. These fats are also found in cold-water fish, such as sardines, salmon, and mackerel.  Limit how much you eat of:  Canned or prepackaged foods.  Food that is high in trans fat, such as fried foods.  Food that is high in saturated fat, such as fatty meat.  Desserts and other sweets, sugary drinks, and other foods with added sugar.  Full-fat dairy products.  Do not salt foods before eating.  Do not eat more than 4 egg yolks a week.  Try to eat at least 2 vegetarian meals a week.  Eat more home-cooked food and less restaurant, buffet, and fast food.  Lifestyle  When eating at a restaurant, ask if your food can be made with less salt or no salt.  If you drink alcohol:  Limit how much you have to:  0-1 drink a day if you are female.  0-2 drinks a day if you are male.  Know how much alcohol is in your drink. In the [place] one drink is one 12 oz bottle of beer (355 mL), one 5 oz glass of wine (148 mL), or one 1½ oz glass of hard liquor (44 mL).  General information  Avoid eating more than 2,300 mg of salt a day. If you have hypertension, you may need to reduce your sodium intake to 1,500 mg a day.  Work with your provider to stay at a healthy body weight or lose weight. Ask what the best weight range is for you.  On most days of the week, get at least 30 minutes of exercise that causes your heart to beat faster. This may include walking, swimming, or biking.  Work with your provider or dietitian to adjust your eating plan to meet your specific calorie needs.  What foods should I eat?  Fruits  All fresh, dried, or frozen fruit. Canned fruits that are in their natural juice and do not have sugar added to them.  Vegetables  Fresh or frozen vegetables that are raw, steamed, roasted, or grilled. Low-sodium or reduced-sodium tomato and vegetable juice. Low-sodium or reduced-sodium tomato sauce and tomato paste. Low-sodium or reduced-sodium  canned vegetables.  Grains  Whole-grain or whole-wheat bread. Whole-grain or whole-wheat pasta. Brown rice. Oatmeal. Quinoa. Bulgur. Whole-grain and low-sodium cereals. [place] bread. Low-fat, low-sodium crackers. Whole-wheat flour tortillas.  Meats and other proteins  Skinless chicken or turkey. Ground chicken or turkey. Pork with fat trimmed off. Fish and seafood. Egg whites. Dried beans, peas, or lentils. Unsalted nuts, nut butters, and seeds. Unsalted canned beans. Lean cuts of beef with fat trimmed off. Low-sodium, lean precooked or cured meat, such as sausages or meat loaves.  Dairy  Low-fat (1%) or fat-free (skim) milk. Reduced-fat, low-fat, or fat-free cheeses. Nonfat, low-sodium ricotta or cottage cheese. Low-fat or nonfat yogurt. Low-fat, low-sodium cheese.  Fats and oils  Soft margarine without trans fats. Vegetable oil. Reduced-fat, low-fat, or light mayonnaise and salad dressings (reduced-sodium). Canola, safflower, olive, avocado, soybean, and sunflower oils. Avocado.  Seasonings and condiments  Herbs. Spices. Seasoning mixes without salt.  Other foods  Unsalted popcorn and pretzels. Fat-free sweets.  The items listed above may not be all the foods and drinks you can have. Talk to a dietitian to learn more.  What foods should I avoid?  Fruits  Canned fruit in a light or heavy syrup. Fried fruit. Fruit in cream or butter sauce.  Vegetables  Creamed or fried vegetables. Vegetables in a cheese sauce. Regular canned vegetables that are not marked as low-sodium or reduced-sodium. Regular canned tomato sauce and paste that are not marked as low-sodium or reduced-sodium. Regular tomato and vegetable juices that are not marked as low-sodium or reduced-sodium. Pickles. Olives.  Grains  Baked goods made with fat, such as croissants, muffins, or some breads. Dry pasta or rice meal packs.  Meats and other proteins  Fatty cuts of meat. Ribs. Fried meat. James. Bologna, salami, and other precooked or cured meats, such  as sausages or meat loaves, that are not lean and low in sodium. Fat from the back of a pig (fatback). Bratwurst. Salted nuts and seeds. Canned beans with added salt. Canned or smoked fish. Whole eggs or egg yolks. Chicken or turkey with skin.  Dairy  Whole or 2% milk, cream, and half-and-half. Whole or full-fat cream cheese. Whole-fat or sweetened yogurt. Full-fat cheese. Nondairy creamers. Whipped toppings. Processed cheese and cheese spreads.  Fats and oils  Butter. Stick margarine. Lard. Shortening. Ghee. James fat. Tropical oils, such as coconut, palm kernel, or palm oil.  Seasonings and condiments  Onion salt, garlic salt, seasoned salt, table salt, and sea salt. Worcestershire sauce. Tartar sauce. Barbecue sauce. Teriyaki sauce. Soy sauce, including reduced-sodium soy sauce. Steak sauce. Canned and packaged gravies. Fish sauce. Oyster sauce. Cocktail sauce. Store-bought horseradish. Ketchup. Mustard. Meat flavorings and tenderizers. Bouillon cubes. Hot sauces. Pre-made or packaged marinades. Pre-made or packaged taco seasonings. Relishes. Regular salad dressings.  Other foods  Salted popcorn and pretzels.  The items listed above may not be all the foods and drinks you should avoid. Talk to a dietitian to learn more.  Where to find more information  National Heart, Lung, and Blood [place] (NHLBI): nhlbi.nih.gov  American Heart Association (AHA): heart.org  Academy of Nutrition and Dietetics: eatright.org  National Kidney Foundation (NKF): kidney.org  This information is not intended to replace advice given to you by your health care provider. Make sure you discuss any questions you have with your health care provider.  Document Revised: 01/04/2024 Document Reviewed: 01/04/2024  [employer] Patient Education © 2024 [employer]  Obesity, Adult  Obesity is the condition of having too much total body fat. Being overweight or obese means that your weight is greater than what is considered healthy for your body  size. Obesity is determined by a measurement called BMI (body mass index). BMI is an estimate of body fat and is calculated from height and weight. For adults, a BMI of 30 or higher is considered obese.  Obesity can lead to other health concerns and major illnesses, including:  Stroke.  Coronary artery disease (CAD).  Type 2 diabetes.  Some types of cancer, including cancers of the colon, breast, uterus, and gallbladder.  High blood pressure (hypertension).  High cholesterol.  Gallbladder stones.  Obesity can also contribute to:  Osteoarthritis.  Sleep apnea.  Infertility problems.  What are the causes?  Common causes of this condition include:  Eating daily meals that are high in calories, sugar, and fat.  Drinking high amounts of sugar-sweetened beverages, such as soft drinks.  Being born with genes that may make you more likely to become obese.  Having a medical condition that causes obesity, including:  Hypothyroidism.  Polycystic ovarian syndrome (PCOS).  Binge-eating disorder.  Cushing syndrome.  Taking certain medicines, such as steroids, antidepressants, and seizure medicines.  Not being physically active (sedentary lifestyle).  Not getting enough sleep.  What increases the risk?  The following factors may make you more likely to develop this condition:  Having a family history of obesity.  Living in an area with limited access to:  [place], recreation centers, or sidewalks.  Healthy food choices, such as grocery stores and [employer] markets.  What are the signs or symptoms?  The main sign of this condition is having too much body fat.  How is this diagnosed?  This condition is diagnosed based on:  Your BMI. If you are an adult with a BMI of 30 or higher, you are considered obese.  Your waist circumference. This measures the distance around your waistline.  Your skinfold thickness. Your health care provider may gently pinch a fold of your skin and measure it.  You may have other tests to check for underlying  conditions.  How is this treated?  Treatment for this condition often includes changing your lifestyle. Treatment may include some or all of the following:  Dietary changes. This may include developing a healthy meal plan.  Regular physical activity. This may include activity that causes your heart to beat faster (aerobic exercise) and strength training. Work with your health care provider to design an exercise program that works for you.  Medicine to help you lose weight if you are unable to lose one pound a week after six weeks of healthy eating and more physical activity.  Treating conditions that cause the obesity (underlying conditions).  Surgery. Surgical options may include gastric banding and gastric bypass. Surgery may be done if:  Other treatments have not helped to improve your condition.  You have a BMI of 40 or higher.  You have life-threatening health problems related to obesity.  Follow these instructions at home:  Eating and drinking    Follow recommendations from your health care provider about what you eat and drink. Your health care provider may advise you to:  Limit fast food, sweets, and processed snack foods.  Choose low-fat options, such as low-fat milk instead of whole milk.  Eat five or more servings of fruits or vegetables every day.  Choose healthy foods when you eat out.  Keep low-fat snacks available.  Limit sugary drinks, such as soda, fruit juice, sweetened iced tea, and flavored milk.  Drink enough water to keep your urine pale yellow.  Do not follow a fad diet. Fad diets can be unhealthy and even dangerous.  Other healthful choices include:  Eat at home more often. This gives you more control over what you eat.  Learn to read food labels. This will help you understand how much food is considered one serving.  Learn what a healthy serving size is.  Physical activity  Exercise regularly, as told by your health care provider.  Most adults should get up to 150 minutes of  moderate-intensity exercise every week.  Ask your health care provider what types of exercise are safe for you and how often you should exercise.  Warm up and stretch before being active.  Cool down and stretch after being active.  Rest between periods of activity.  Lifestyle  Work with your health care provider and a dietitian to set a weight-loss goal that is healthy and reasonable for you.  Limit your screen time.  Find ways to reward yourself that do not involve food.  Do not drink alcohol if:  Your health care provider tells you not to drink.  You are pregnant, may be pregnant, or are planning to become pregnant.  If you drink alcohol:  Limit how much you have to:  0-1 drink a day for women.  0-2 drinks a day for men.  Know how much alcohol is in your drink. In the [place] one drink equals one 12 oz bottle of beer (355 mL), one 5 oz glass of wine (148 mL), or one 1½ oz glass of hard liquor (44 mL).  General instructions  Keep a weight-loss journal to keep track of the food you eat and how much exercise you get.  Take over-the-counter and prescription medicines only as told by your health care provider.  Take vitamins and supplements only as told by your health care provider.  Consider joining a support group. Your health care provider may be able to recommend a support group.  Pay attention to your mental health as obesity can lead to depression or self esteem issues.  Keep all follow-up visits. This is important.  Contact a health care provider if:  You are unable to meet your weight-loss goal after six weeks of dietary and lifestyle changes.  You have trouble breathing.  Summary  Obesity is the condition of having too much total body fat.  Being overweight or obese means that your weight is greater than what is considered healthy for your body size.  Work with your health care provider and a dietitian to set a weight-loss goal that is healthy and reasonable for you.  Exercise regularly, as told by your health  care provider. Ask your health care provider what types of exercise are safe for you and how often you should exercise.  This information is not intended to replace advice given to you by your health care provider. Make sure you discuss any questions you have with your health care provider.  Document Revised: 07/26/2022 Document Reviewed: 07/26/2022  [employer] Patient Education © 2024 [employer]  Allergic Rhinitis, Adult    Allergic rhinitis is an allergic reaction that affects the mucous membrane inside the nose. The mucous membrane is the tissue that produces mucus.  There are two types of allergic rhinitis:  Seasonal. This type is also called hay fever and happens only during certain seasons.  Perennial. This type can happen at any time of the year.  Allergic rhinitis cannot be spread from person to person. This condition can be mild, bad, or very bad. It can develop at any age and may be outgrown.  What are the causes?  This condition is caused by allergens. These are things that can cause an allergic reaction. Allergens may differ for seasonal allergic rhinitis and perennial allergic rhinitis.  Seasonal allergic rhinitis is caused by pollen. Pollen can come from grasses, trees, and weeds.  Perennial allergic rhinitis may be caused by:  Dust mites.  Proteins in a pet's pee (urine), saliva, or dander. Dander is dead skin cells from a pet.  Smoke, mold, or car fumes.  Remains of or waste from insects such as cockroaches.  What increases the risk?  You are more likely to develop this condition if you have a family history of allergies or other conditions related to allergies, including:  Allergic conjunctivitis. This is irritation and swelling of parts of the eyes and eyelids.  Asthma. This condition affects the lungs and makes it hard to breathe.  Atopic dermatitis or eczema. This is long term (chronic) irritation and swelling of the skin.  Food allergies.  What are the signs or symptoms?  Symptoms of this  condition include:  Sneezing or coughing.  A stuffy nose (nasal congestion), itchy nose, or nasal discharge.  Itchy eyes and tearing of the eyes.  A feeling of mucus dripping down the back of your throat (postnasal drip). This may cause a sore throat.  Trouble sleeping.  Tiredness.  Headache.  How is this diagnosed?  This condition may be diagnosed with your symptoms, your medical history, and a physical exam. Your health care provider may check for related conditions, such as:  Asthma.  Pink eye. This is eye swelling and irritation caused by infection (conjunctivitis).  Ear infection.  Upper respiratory infection. This is an infection in the nose, throat, or upper airways.  You may also have tests to find out which allergens cause your symptoms. These may include skin tests or blood tests.  How is this treated?  There is no cure for this condition, but treatment can help control symptoms. Treatment may include:  Taking medicines that block allergy symptoms, such as corticosteroids (anti-inflammatories) and antihistamines. Medicine may be given as a shot, nasal spray, or pill.  Avoiding any allergens.  Being exposed again and again to tiny amounts of allergens to help you build a defense against allergens (allergenimmunotherapy). This is done if other treatments have not helped. It may include:  Allergy shots. These are injected medicines that have small amounts of an allergen in them.  Sublingual immunotherapy. This involves taking small doses of a medicine with an allergen in it under your tongue.  If these treatments do not work, your provider may prescribe newer, stronger medicines.  Follow these instructions at home:  Avoiding allergens  Find out what you are allergic to and avoid those allergens. These are some things you can do to help avoid allergens:  If you have perennial allergies:  Replace carpet with wood, tile, or vinyl tim. Carpet can trap dander and dust.  Do not smoke. Do not allow smoking in  your home  Change your heating and air conditioning filters at least once a month.  If you have seasonal allergies, take these steps during allergy season:  Keep windows closed as much as possible.  Plan outdoor activities when pollen counts are lowest. Check pollen counts before you plan outdoor activities  When coming indoors, change clothing and shower before sitting on furniture or bedding.  If you have a pet in the house that produces allergens:  Keep the pet out of the bedroom.  Vacuum, sweep, and dust regularly.  General instructions  Take over-the-counter and prescription medicines only as told by your provider.  Drink enough fluid to keep your pee pale yellow.  Where to find more information  American Academy of [employer] Asthma [employer] Immunology: aaaai.org  Contact a health care provider if:  You have a fever.  You develop a cough that does not go away.  You make high-pitched whistling sounds when you breathe, most often when you breathe out (wheeze).  Your symptoms slow you down or stop you from doing your normal activities each day.  Get help right away if:  You have shortness of breath.  This symptom may be an emergency. Get help right away. Call 911.  Do not wait to see if the symptoms will go away.  Do not drive yourself to the hospital.  This information is not intended to replace advice given to you by your health care provider. Make sure you discuss any questions you have with your health care provider.  Document Revised: 08/28/2023 Document Reviewed: 08/28/2023  Elsevier Patient Education © 2024 [employer]

## 2024-04-23 NOTE — PROGRESS NOTES
Chief Complaint   Patient presents with    Hypertension    Obesity    Asthma    Memory Loss     Forgetting things a lot.        Subjective   Polo Mcguire is a 19 y.o. male    History of Present Illness  Patient is a to follow-up on high blood pressure, weight and asthma.Does not check BP at home. Reports he is taking his BP medication propanolol 40 mg. His BP is 128/88 today in clinic. Asthma is well controlled per patient, does still have wheezing at times. Sleep disturbance and anxiety significantly improved with Cymbalta 30 mg. He just got the Aimovig 70 mg/mL authorization from his neurologist and will begin taking that medication today. He does report his headaches have improved with sumatriptan 100 mg. States he has infrequently had headaches/migraines since getting his TPI nerve block on April 15. Still has issues with memory loss.     No Known Allergies  Past Medical History:   Diagnosis Date    Acid reflux     Allergic     Asthma     Migraines       Past Surgical History:   Procedure Laterality Date    HERNIA REPAIR       Social History     Socioeconomic History    Marital status: Single   Tobacco Use    Smoking status: Never    Smokeless tobacco: Never   Vaping Use    Vaping status: Never Used   Substance and Sexual Activity    Alcohol use: Never    Drug use: Never    Sexual activity: Never        Current Outpatient Medications:     Aimovig 70 MG/ML auto-injector, Inject 1 mL under the skin into the appropriate area as directed Every 30 (Thirty) Days., Disp: , Rfl:     DULoxetine (CYMBALTA) 30 MG capsule, Take 1 capsule by mouth Daily., Disp: , Rfl:     loratadine (CLARITIN) 10 MG tablet, Take 1 tablet by mouth Daily., Disp: 90 tablet, Rfl: 3    propranolol (INDERAL) 40 MG tablet, Take 1 tablet by mouth 2 (Two) Times a Day., Disp: 60 tablet, Rfl: 5    SUMAtriptan (IMITREX) 100 MG tablet, Take 1 tablet by mouth., Disp: , Rfl:     Symbicort 80-4.5 MCG/ACT inhaler, INHALE ONE PUFF BY MOUTH TWO TIMES A DAY,  Disp: , Rfl:     fluticasone (FLONASE) 50 MCG/ACT nasal spray, 2 sprays into the nostril(s) as directed by provider Daily., Disp: 18.2 mL, Rfl: 11    montelukast (Singulair) 10 MG tablet, Take 1 tablet by mouth Every Night., Disp: 90 tablet, Rfl: 1     Review of Systems   Constitutional:  Negative for chills, fatigue, fever and unexpected weight change.   Respiratory:  Positive for wheezing. Negative for cough, chest tightness and shortness of breath.    Cardiovascular:  Negative for chest pain, palpitations and leg swelling.   Gastrointestinal:  Negative for constipation, diarrhea, nausea and vomiting.   Genitourinary:  Negative for difficulty urinating and dysuria.   Skin:  Negative for color change and rash.   Neurological:  Positive for headaches. Negative for dizziness, syncope and weakness.   Psychiatric/Behavioral:  Negative for sleep disturbance.        Objective     Vitals:    04/23/24 1555   BP: 128/88   Pulse: 82   Temp: 97.5 °F (36.4 °C)   SpO2: 98%         04/23/24  1555   Weight: 106 kg (233 lb 11.2 oz)     Body mass index is 33.31 kg/m².  No results found for this or any previous visit.    Physical Exam  Vitals reviewed.   Constitutional:       Appearance: He is well-developed.   HENT:      Head: Normocephalic and atraumatic.   Cardiovascular:      Rate and Rhythm: Normal rate and regular rhythm.      Heart sounds: Normal heart sounds.   Pulmonary:      Effort: Pulmonary effort is normal.      Breath sounds: Wheezing (minimal) present.   Skin:     General: Skin is warm and dry.   Neurological:      Mental Status: He is alert and oriented to person, place, and time.   Psychiatric:         Behavior: Behavior normal.         Assessment & Plan   Problems Addressed this Visit          Cardiac and Vasculature    Primary hypertension       Endocrine and Metabolic    Class 1 obesity due to excess calories without serious comorbidity with body mass index (BMI) of 32.0 to 32.9 in adult       Mental Health     Anxiety       Neuro    Headache - Primary    Post concussion syndrome       Pulmonary and Pneumonias    Moderate persistent asthma without complication    Relevant Medications    montelukast (Singulair) 10 MG tablet     Other Visit Diagnoses       Seasonal allergic rhinitis, unspecified trigger        Relevant Medications    loratadine (CLARITIN) 10 MG tablet    fluticasone (FLONASE) 50 MCG/ACT nasal spray    montelukast (Singulair) 10 MG tablet          Diagnoses         Codes Comments    Nonintractable headache, unspecified chronicity pattern, unspecified headache type    -  Primary ICD-10-CM: R51.9  ICD-9-CM: 784.0     Post concussion syndrome     ICD-10-CM: F07.81  ICD-9-CM: 310.2     Anxiety     ICD-10-CM: F41.9  ICD-9-CM: 300.00     Class 1 obesity due to excess calories without serious comorbidity with body mass index (BMI) of 32.0 to 32.9 in adult     ICD-10-CM: E66.09, Z68.32  ICD-9-CM: 278.00, V85.32     Primary hypertension     ICD-10-CM: I10  ICD-9-CM: 401.9     Moderate persistent asthma without complication     ICD-10-CM: J45.40  ICD-9-CM: 493.90     Seasonal allergic rhinitis, unspecified trigger     ICD-10-CM: J30.2  ICD-9-CM: 477.9         Patient was encouraged to keep me informed of any acute changes, lack of improvement, or any new concerning symptoms.  If patient becomes concerned, or has any worsening symptoms, they are to report either here, urgent treatment, or emergency room. Plan of care discussed with pt. They verbalized understanding and agreement.     Patient was given instructions on medication prescribed, including how to take, common side effects, and when it will have full effectiveness. The patient was instructed to call my office if any side effects are noted. The patient was also instructed to stop this medication if their symptoms worsen, their mood worsens, or any desire to hurt someone or themselves. Patient was given my card and informed how to contact me if needed or call my  office.     For his increased allergy symptoms I have added Flonase 50 mcg/ACT and Singulair 10 mg tablet in addition to his loratadine 10 mg daily.     Discussed need for weight management.  I recommend they use a weight loss jose david on their phone, eat no more than [id number] earline/day, or use intermittent fasting and exercise 30 to 60 minutes 3 times a week.  Discussed weight loss with diet, recommend Weight Watchers or Mediterranean Diet, but stated that whatever method works for this patient is best. The patient agrees with all recommendations at this time. I have discussed a weight loss plan to be determined by this patient.     Patient instructed to check blood pressure daily, record, and bring to next visit. If the readings run 140/90 or greater, the patient is to call my office or return sooner for evaluation. Pt advised to eat a heart healthy diet and get regular aerobic exercise.    Time spent on visit, including counseling, education, reviewing the chart, and any recent test results, was    20    Minutes    Return visit in 3 months    Counseling provided on weight management and allergic rhinitis.    I, Bandar TRAN, personally performed the services described in this documentation, as scribed by [employer] student, in my presence, and is both accurate and complete.       MARC Borges   4/23/2024   17:25 EDT     Please note that portions of this document were completed with a voice recognition program.     At [facility], we believe that sharing information builds trust and better relationships. You are receiving this note because you are receiving care at [facility] or have recently visited. It is possible you will see health information before a provider has talked with you about it. This kind of information can be easy to misunderstand as it is a medical document. It is intended as [id number] communication. It is written in medical language and may contain abbreviations or  verbiage that are unfamiliar. It may appear blunt or direct. Medical documents are intended to carry relevant information, facts as evident, and the clinical opinion of the practitioner.  To help you fully understand what it means for your health, we urge you to discuss this note with your provider.

## 2024-07-23 ENCOUNTER — LAB (OUTPATIENT)
Age: 19
End: 2024-07-23
Payer: COMMERCIAL

## 2024-07-23 ENCOUNTER — OFFICE VISIT (OUTPATIENT)
Age: 19
End: 2024-07-23
Payer: COMMERCIAL

## 2024-07-23 VITALS
RESPIRATION RATE: 18 BRPM | BODY MASS INDEX: 33.58 KG/M2 | DIASTOLIC BLOOD PRESSURE: 80 MMHG | SYSTOLIC BLOOD PRESSURE: 120 MMHG | HEIGHT: 70 IN | HEART RATE: 74 BPM | OXYGEN SATURATION: 100 % | WEIGHT: 234.6 LBS

## 2024-07-23 DIAGNOSIS — Z00.00 WELLNESS EXAMINATION: ICD-10-CM

## 2024-07-23 DIAGNOSIS — Z11.59 NEED FOR HEPATITIS C SCREENING TEST: ICD-10-CM

## 2024-07-23 DIAGNOSIS — Z11.59 SCREENING EXAMINATION FOR POLIOMYELITIS: ICD-10-CM

## 2024-07-23 DIAGNOSIS — E66.09 EXOGENOUS OBESITY: ICD-10-CM

## 2024-07-23 DIAGNOSIS — Z13.29 SCREENING FOR THYROID DISORDER: ICD-10-CM

## 2024-07-23 DIAGNOSIS — I10 PRIMARY HYPERTENSION: Primary | ICD-10-CM

## 2024-07-23 DIAGNOSIS — E55.9 VITAMIN D DEFICIENCY DISEASE: ICD-10-CM

## 2024-07-23 DIAGNOSIS — Z13.220 SCREENING FOR LIPOID DISORDERS: Primary | ICD-10-CM

## 2024-07-23 DIAGNOSIS — I10 ESSENTIAL HYPERTENSION, MALIGNANT: ICD-10-CM

## 2024-07-23 DIAGNOSIS — F07.81 POST CONCUSSION SYNDROME: ICD-10-CM

## 2024-07-23 DIAGNOSIS — Z13.220 SCREENING FOR LIPID DISORDERS: ICD-10-CM

## 2024-07-23 DIAGNOSIS — G47.00 INSOMNIA, UNSPECIFIED TYPE: ICD-10-CM

## 2024-07-23 DIAGNOSIS — I10 PRIMARY HYPERTENSION: ICD-10-CM

## 2024-07-23 DIAGNOSIS — Z13.0 SCREENING FOR DEFICIENCY ANEMIA: ICD-10-CM

## 2024-07-23 DIAGNOSIS — F41.9 ANXIETY: ICD-10-CM

## 2024-07-23 DIAGNOSIS — Z00.00 ROUTINE GENERAL MEDICAL EXAMINATION AT A HEALTH CARE FACILITY: ICD-10-CM

## 2024-07-23 DIAGNOSIS — R25.1 TREMOR: ICD-10-CM

## 2024-07-23 DIAGNOSIS — Z13.0 SCREENING FOR IRON DEFICIENCY ANEMIA: ICD-10-CM

## 2024-07-23 DIAGNOSIS — E55.9 VITAMIN D DEFICIENCY: ICD-10-CM

## 2024-07-23 DIAGNOSIS — E66.09 CLASS 1 OBESITY DUE TO EXCESS CALORIES WITHOUT SERIOUS COMORBIDITY WITH BODY MASS INDEX (BMI) OF 32.0 TO 32.9 IN ADULT: ICD-10-CM

## 2024-07-23 DIAGNOSIS — E66.09 CLASS 1 OBESITY DUE TO EXCESS CALORIES WITH SERIOUS COMORBIDITY AND BODY MASS INDEX (BMI) OF 33.0 TO 33.9 IN ADULT: ICD-10-CM

## 2024-07-23 LAB
DEPRECATED RDW RBC AUTO: 42.1 FL (ref 37–54)
ERYTHROCYTE [DISTWIDTH] IN BLOOD BY AUTOMATED COUNT: 12.9 % (ref 12.3–15.4)
HCT VFR BLD AUTO: 51.7 % (ref 37.5–51)
HGB BLD-MCNC: 16.8 G/DL (ref 13–17.7)
MCH RBC QN AUTO: 29.1 PG (ref 26.6–33)
MCHC RBC AUTO-ENTMCNC: 32.5 G/DL (ref 31.5–35.7)
MCV RBC AUTO: 89.6 FL (ref 79–97)
PLATELET # BLD AUTO: 270 10*3/MM3 (ref 140–450)
PMV BLD AUTO: 9.9 FL (ref 6–12)
RBC # BLD AUTO: 5.77 10*6/MM3 (ref 4.14–5.8)
WBC NRBC COR # BLD AUTO: 11.17 10*3/MM3 (ref 3.4–10.8)

## 2024-07-23 PROCEDURE — 1159F MED LIST DOCD IN RCRD: CPT | Performed by: NURSE PRACTITIONER

## 2024-07-23 PROCEDURE — 1160F RVW MEDS BY RX/DR IN RCRD: CPT | Performed by: NURSE PRACTITIONER

## 2024-07-23 PROCEDURE — 86803 HEPATITIS C AB TEST: CPT | Performed by: NURSE PRACTITIONER

## 2024-07-23 PROCEDURE — 3079F DIAST BP 80-89 MM HG: CPT | Performed by: NURSE PRACTITIONER

## 2024-07-23 PROCEDURE — 80061 LIPID PANEL: CPT | Performed by: NURSE PRACTITIONER

## 2024-07-23 PROCEDURE — 36415 COLL VENOUS BLD VENIPUNCTURE: CPT | Performed by: NURSE PRACTITIONER

## 2024-07-23 PROCEDURE — 80050 GENERAL HEALTH PANEL: CPT | Performed by: NURSE PRACTITIONER

## 2024-07-23 PROCEDURE — 99214 OFFICE O/P EST MOD 30 MIN: CPT | Performed by: NURSE PRACTITIONER

## 2024-07-23 PROCEDURE — 82306 VITAMIN D 25 HYDROXY: CPT | Performed by: NURSE PRACTITIONER

## 2024-07-23 PROCEDURE — 3074F SYST BP LT 130 MM HG: CPT | Performed by: NURSE PRACTITIONER

## 2024-07-23 RX ORDER — PROPRANOLOL HYDROCHLORIDE 40 MG/1
40 TABLET ORAL 2 TIMES DAILY
Qty: 60 TABLET | Refills: 5 | Status: SHIPPED | OUTPATIENT
Start: 2024-07-23

## 2024-07-23 RX ORDER — QUETIAPINE FUMARATE 25 MG/1
25 TABLET, FILM COATED ORAL NIGHTLY
Qty: 30 TABLET | Refills: 2 | Status: SHIPPED | OUTPATIENT
Start: 2024-07-23

## 2024-07-23 NOTE — PATIENT INSTRUCTIONS
Obesity, Adult  Obesity is the condition of having too much total body fat. Being overweight or obese means that your weight is greater than what is considered healthy for your body size. Obesity is determined by a measurement called BMI (body mass index). BMI is an estimate of body fat and is calculated from height and weight. For adults, a BMI of 30 or higher is considered obese.  Obesity can lead to other health concerns and major illnesses, including:  Stroke.  Coronary artery disease (CAD).  Type 2 diabetes.  Some types of cancer, including cancers of the colon, breast, uterus, and gallbladder.  High blood pressure (hypertension).  High cholesterol.  Gallbladder stones.  Obesity can also contribute to:  Osteoarthritis.  Sleep apnea.  Infertility problems.  What are the causes?  Common causes of this condition include:  Eating daily meals that are high in calories, sugar, and fat.  Drinking high amounts of sugar-sweetened beverages, such as soft drinks.  Being born with genes that may make you more likely to become obese.  Having a medical condition that causes obesity, including:  Hypothyroidism.  Polycystic ovarian syndrome (PCOS).  Binge-eating disorder.  Cushing syndrome.  Taking certain medicines, such as steroids, antidepressants, and seizure medicines.  Not being physically active (sedentary lifestyle).  Not getting enough sleep.  What increases the risk?  The following factors may make you more likely to develop this condition:  Having a family history of obesity.  Living in an area with limited access to:  [place], recreation centers, or sidewalks.  Healthy food choices, such as grocery stores and [employer] markets.  What are the signs or symptoms?  The main sign of this condition is having too much body fat.  How is this diagnosed?  This condition is diagnosed based on:  Your BMI. If you are an adult with a BMI of 30 or higher, you are considered obese.  Your waist circumference. This measures the  distance around your waistline.  Your skinfold thickness. Your health care provider may gently pinch a fold of your skin and measure it.  You may have other tests to check for underlying conditions.  How is this treated?  Treatment for this condition often includes changing your lifestyle. Treatment may include some or all of the following:  Dietary changes. This may include developing a healthy meal plan.  Regular physical activity. This may include activity that causes your heart to beat faster (aerobic exercise) and strength training. Work with your health care provider to design an exercise program that works for you.  Medicine to help you lose weight if you are unable to lose one pound a week after six weeks of healthy eating and more physical activity.  Treating conditions that cause the obesity (underlying conditions).  Surgery. Surgical options may include gastric banding and gastric bypass. Surgery may be done if:  Other treatments have not helped to improve your condition.  You have a BMI of 40 or higher.  You have life-threatening health problems related to obesity.  Follow these instructions at home:  Eating and drinking    Follow recommendations from your health care provider about what you eat and drink. Your health care provider may advise you to:  Limit fast food, sweets, and processed snack foods.  Choose low-fat options, such as low-fat milk instead of whole milk.  Eat five or more servings of fruits or vegetables every day.  Choose healthy foods when you eat out.  Keep low-fat snacks available.  Limit sugary drinks, such as soda, fruit juice, sweetened iced tea, and flavored milk.  Drink enough water to keep your urine pale yellow.  Do not follow a fad diet. Fad diets can be unhealthy and even dangerous.  Other healthful choices include:  Eat at home more often. This gives you more control over what you eat.  Learn to read food labels. This will help you understand how much food is considered one  serving.  Learn what a healthy serving size is.  Physical activity  Exercise regularly, as told by your health care provider.  Most adults should get up to 150 minutes of moderate-intensity exercise every week.  Ask your health care provider what types of exercise are safe for you and how often you should exercise.  Warm up and stretch before being active.  Cool down and stretch after being active.  Rest between periods of activity.  Lifestyle  Work with your health care provider and a dietitian to set a weight-loss goal that is healthy and reasonable for you.  Limit your screen time.  Find ways to reward yourself that do not involve food.  Do not drink alcohol if:  Your health care provider tells you not to drink.  You are pregnant, may be pregnant, or are planning to become pregnant.  If you drink alcohol:  Limit how much you have to:  0-1 drink a day for women.  0-2 drinks a day for men.  Know how much alcohol is in your drink. In the [place] one drink equals one 12 oz bottle of beer (355 mL), one 5 oz glass of wine (148 mL), or one 1½ oz glass of hard liquor (44 mL).  General instructions  Keep a weight-loss journal to keep track of the food you eat and how much exercise you get.  Take over-the-counter and prescription medicines only as told by your health care provider.  Take vitamins and supplements only as told by your health care provider.  Consider joining a support group. Your health care provider may be able to recommend a support group.  Pay attention to your mental health as obesity can lead to depression or self esteem issues.  Keep all follow-up visits. This is important.  Contact a health care provider if:  You are unable to meet your weight-loss goal after six weeks of dietary and lifestyle changes.  You have trouble breathing.  Summary  Obesity is the condition of having too much total body fat.  Being overweight or obese means that your weight is greater than what is considered healthy for your body  size.  Work with your health care provider and a dietitian to set a weight-loss goal that is healthy and reasonable for you.  Exercise regularly, as told by your health care provider. Ask your health care provider what types of exercise are safe for you and how often you should exercise.  This information is not intended to replace advice given to you by your health care provider. Make sure you discuss any questions you have with your health care provider.  Document Revised: 07/26/2022 Document Reviewed: 07/26/2022  [employer] Patient Education © 2024 [employer]  Managing Your Hypertension  Hypertension, also called high blood pressure, is when the force of the blood pressing against the walls of the arteries is too strong. Arteries are blood vessels that carry blood from your heart throughout your body. Hypertension forces the heart to work harder to pump blood and may cause the arteries to become narrow or stiff.  Understanding blood pressure readings  A blood pressure reading includes a higher number over a lower number:  The first, or top, number is called the systolic pressure. It is a measure of the pressure in your arteries as your heart beats.  The second, or bottom number, is called the diastolic pressure. It is a measure of the pressure in your arteries as the heart relaxes.  For most people, a normal blood pressure is below 120/80. Your personal target blood pressure may vary depending on your medical conditions, your age, and other factors.  Blood pressure is classified into four stages. Based on your blood pressure reading, your health care provider may use the following stages to determine what type of treatment you need, if any. Systolic pressure and diastolic pressure are measured in a unit called millimeters of mercury (mmHg).  Normal  Systolic pressure: below 120.  Diastolic pressure: below 80.  Elevated  Systolic pressure: 120-129.  Diastolic pressure: below 80.  Hypertension stage 1  Systolic  pressure: 130-139.  Diastolic pressure: 80-89.  Hypertension stage 2  Systolic pressure: 140 or above.  Diastolic pressure: 90 or above.  How can this condition affect me?  Managing your hypertension is very important. Over time, hypertension can damage the arteries and decrease blood flow to parts of the body, including the brain, heart, and kidneys. Having untreated or uncontrolled hypertension can lead to:  A heart attack.  A stroke.  A weakened blood vessel (aneurysm).  Heart failure.  Kidney damage.  Eye damage.  Memory and concentration problems.  Vascular dementia.  What actions can I take to manage this condition?  Hypertension can be managed by making lifestyle changes and possibly by taking medicines. Your health care provider will help you make a plan to bring your blood pressure within a normal range. You may be referred for counseling on a healthy diet and physical activity.  Nutrition    Eat a diet that is high in fiber and potassium, and low in salt (sodium), added sugar, and fat. An example eating plan is called the DASH diet. DASH stands for Dietary Approaches to Stop Hypertension. To eat this way:  Eat plenty of fresh fruits and vegetables. Try to fill one-half of your plate at each meal with fruits and vegetables.  Eat whole grains, such as whole-wheat pasta, brown rice, or whole-grain bread. Fill about one-fourth of your plate with whole grains.  Eat low-fat dairy products.  Avoid fatty cuts of meat, processed or cured meats, and poultry with skin. Fill about one-fourth of your plate with lean proteins such as fish, chicken without skin, beans, eggs, and tofu.  Avoid pre-made and processed foods. These tend to be higher in sodium, added sugar, and fat.  Reduce your daily sodium intake. Many people with hypertension should eat less than 1,500 mg of sodium a day.  Lifestyle    Work with your health care provider to maintain a healthy body weight or to lose weight. Ask what an ideal weight is for  you.  Get at least 30 minutes of exercise that causes your heart to beat faster (aerobic exercise) most days of the week. Activities may include walking, swimming, or biking.  Include exercise to strengthen your muscles (resistance exercise), such as weight lifting, as part of your weekly exercise routine. Try to do these types of exercises for 30 minutes at least 3 days a week.  Do not use any products that contain nicotine or tobacco. These products include cigarettes, chewing tobacco, and vaping devices, such as e-cigarettes. If you need help quitting, ask your health care provider.  Control any long-term (chronic) conditions you have, such as high cholesterol or diabetes.  Identify your sources of stress and find ways to manage stress. This may include meditation, deep breathing, or making time for fun activities.  Alcohol use  Do not drink alcohol if:  Your health care provider tells you not to drink.  You are pregnant, may be pregnant, or are planning to become pregnant.  If you drink alcohol:  Limit how much you have to:  0-1 drink a day for women.  0-2 drinks a day for men.  Know how much alcohol is in your drink. In the [place] one drink equals one 12 oz bottle of beer (355 mL), one 5 oz glass of wine (148 mL), or one 1½ oz glass of hard liquor (44 mL).  Medicines  Your health care provider may prescribe medicine if lifestyle changes are not enough to get your blood pressure under control and if:  Your systolic blood pressure is 130 or higher.  Your diastolic blood pressure is 80 or higher.  Take medicines only as told by your health care provider. Follow the directions carefully. Blood pressure medicines must be taken as told by your health care provider. The medicine does not work as well when you skip doses. Skipping doses also puts you at risk for problems.  Monitoring  Before you monitor your blood pressure:  Do not smoke, drink caffeinated beverages, or exercise within 30 minutes before taking a  measurement.  Use the bathroom and empty your bladder (urinate).  Sit quietly for at least 5 minutes before taking measurements.  Monitor your blood pressure at home as told by your health care provider. To do this:  Sit with your back straight and supported.  Place your feet flat on the floor. Do not cross your legs.  Support your arm on a flat surface, such as a table. Make sure your upper arm is at heart level.  Each time you measure, take two or three readings one minute apart and record the results.  You may also need to have your blood pressure checked regularly by your health care provider.  General information  Talk with your health care provider about your diet, exercise habits, and other lifestyle factors that may be contributing to hypertension.  Review all the medicines you take with your health care provider because there may be side effects or interactions.  Keep all follow-up visits. Your health care provider can help you create and adjust your plan for managing your high blood pressure.  Where to find more information  National Heart, Lung, and Blood [place]: www.nhlbi.nih.gov  American Heart Association: www.heart.org  Contact a health care provider if:  You think you are having a reaction to medicines you have taken.  You have repeated (recurrent) headaches.  You feel dizzy.  You have swelling in your ankles.  You have trouble with your vision.  Get help right away if:  You develop a severe headache or confusion.  You have unusual weakness or numbness, or you feel faint.  You have severe pain in your chest or abdomen.  You vomit repeatedly.  You have trouble breathing.  These symptoms may be an emergency. Get help right away. Call 911.  Do not wait to see if the symptoms will go away.  Do not drive yourself to the hospital.  Summary  Hypertension is when the force of blood pumping through your arteries is too strong. If this condition is not controlled, it may put you at risk for serious  "complications.  Your personal target blood pressure may vary depending on your medical conditions, your age, and other factors. For most people, a normal blood pressure is less than 120/80.  Hypertension is managed by lifestyle changes, medicines, or both.  Lifestyle changes to help manage hypertension include losing weight, eating a healthy, low-sodium diet, exercising more, stopping smoking, and limiting alcohol.  This information is not intended to replace advice given to you by your health care provider. Make sure you discuss any questions you have with your health care provider.  Document Revised: 09/01/2022 Document Reviewed: 09/01/2022  [employer] Patient Education © 2024 [employer]  DASH Eating Plan  DASH stands for Dietary Approaches to Stop Hypertension. The DASH eating plan is a healthy eating plan that has been shown to:  Lower high blood pressure (hypertension).  Reduce your risk for type 2 diabetes, heart disease, and stroke.  Help with weight loss.  What are tips for following this plan?  Reading food labels  Check food labels for the amount of salt (sodium) per serving. Choose foods with less than 5 percent of the Daily Value (DV) of sodium. In general, foods with less than 300 milligrams (mg) of sodium per serving fit into this eating plan.  To find whole grains, look for the word \"whole\" as the first word in the ingredient list.  Shopping  Buy products labeled as \"low-sodium\" or \"no salt added.\"  Buy fresh foods. Avoid canned foods and pre-made or frozen meals.  Cooking  Try not to add salt when you cook. Use salt-free seasonings or herbs instead of table salt or sea salt. Check with your health care provider or pharmacist before using salt substitutes.  Do not santiago foods. Cook foods in healthy ways, such as baking, boiling, grilling, roasting, or broiling.  Cook using oils that are good for your heart. These include olive, canola, avocado, soybean, and sunflower oil.  Meal planning    Eat a " balanced diet. This should include:  4 or more servings of fruits and 4 or more servings of vegetables each day. Try to fill half of your plate with fruits and vegetables.  6-8 servings of whole grains each day.  6 or less servings of lean meat, poultry, or fish each day. 1 oz is 1 serving. A 3 oz (85 g) serving of meat is about the same size as the palm of your hand. One egg is 1 oz (28 g).  2-3 servings of low-fat dairy each day. One serving is 1 cup (237 mL).  1 serving of nuts, seeds, or beans 5 times each week.  2-3 servings of heart-healthy fats. Healthy fats called omega-3 fatty acids are found in foods such as walnuts, flaxseeds, fortified milks, and eggs. These fats are also found in cold-water fish, such as sardines, salmon, and mackerel.  Limit how much you eat of:  Canned or prepackaged foods.  Food that is high in trans fat, such as fried foods.  Food that is high in saturated fat, such as fatty meat.  Desserts and other sweets, sugary drinks, and other foods with added sugar.  Full-fat dairy products.  Do not salt foods before eating.  Do not eat more than 4 egg yolks a week.  Try to eat at least 2 vegetarian meals a week.  Eat more home-cooked food and less restaurant, buffet, and fast food.  Lifestyle  When eating at a restaurant, ask if your food can be made with less salt or no salt.  If you drink alcohol:  Limit how much you have to:  0-1 drink a day if you are female.  0-2 drinks a day if you are male.  Know how much alcohol is in your drink. In the [place] one drink is one 12 oz bottle of beer (355 mL), one 5 oz glass of wine (148 mL), or one 1½ oz glass of hard liquor (44 mL).  General information  Avoid eating more than 2,300 mg of salt a day. If you have hypertension, you may need to reduce your sodium intake to 1,500 mg a day.  Work with your provider to stay at a healthy body weight or lose weight. Ask what the best weight range is for you.  On most days of the week, get at least 30 minutes  of exercise that causes your heart to beat faster. This may include walking, swimming, or biking.  Work with your provider or dietitian to adjust your eating plan to meet your specific calorie needs.  What foods should I eat?  Fruits  All fresh, dried, or frozen fruit. Canned fruits that are in their natural juice and do not have sugar added to them.  Vegetables  Fresh or frozen vegetables that are raw, steamed, roasted, or grilled. Low-sodium or reduced-sodium tomato and vegetable juice. Low-sodium or reduced-sodium tomato sauce and tomato paste. Low-sodium or reduced-sodium canned vegetables.  Grains  Whole-grain or whole-wheat bread. Whole-grain or whole-wheat pasta. Brown rice. Oatmeal. Quinoa. Bulgur. Whole-grain and low-sodium cereals. [place] bread. Low-fat, low-sodium crackers. Whole-wheat flour tortillas.  Meats and other proteins  Skinless chicken or turkey. Ground chicken or turkey. Pork with fat trimmed off. Fish and seafood. Egg whites. Dried beans, peas, or lentils. Unsalted nuts, nut butters, and seeds. Unsalted canned beans. Lean cuts of beef with fat trimmed off. Low-sodium, lean precooked or cured meat, such as sausages or meat loaves.  Dairy  Low-fat (1%) or fat-free (skim) milk. Reduced-fat, low-fat, or fat-free cheeses. Nonfat, low-sodium ricotta or cottage cheese. Low-fat or nonfat yogurt. Low-fat, low-sodium cheese.  Fats and oils  Soft margarine without trans fats. Vegetable oil. Reduced-fat, low-fat, or light mayonnaise and salad dressings (reduced-sodium). Canola, safflower, olive, avocado, soybean, and sunflower oils. Avocado.  Seasonings and condiments  Herbs. Spices. Seasoning mixes without salt.  Other foods  Unsalted popcorn and pretzels. Fat-free sweets.  The items listed above may not be all the foods and drinks you can have. Talk to a dietitian to learn more.  What foods should I avoid?  Fruits  Canned fruit in a light or heavy syrup. Fried fruit. Fruit in cream or butter  sauce.  Vegetables  Creamed or fried vegetables. Vegetables in a cheese sauce. Regular canned vegetables that are not marked as low-sodium or reduced-sodium. Regular canned tomato sauce and paste that are not marked as low-sodium or reduced-sodium. Regular tomato and vegetable juices that are not marked as low-sodium or reduced-sodium. Pickles. Olives.  Grains  Baked goods made with fat, such as croissants, muffins, or some breads. Dry pasta or rice meal packs.  Meats and other proteins  Fatty cuts of meat. Ribs. Fried meat. James. Bologna, salami, and other precooked or cured meats, such as sausages or meat loaves, that are not lean and low in sodium. Fat from the back of a pig (fatback). Bratwurst. Salted nuts and seeds. Canned beans with added salt. Canned or smoked fish. Whole eggs or egg yolks. Chicken or turkey with skin.  Dairy  Whole or 2% milk, cream, and half-and-half. Whole or full-fat cream cheese. Whole-fat or sweetened yogurt. Full-fat cheese. Nondairy creamers. Whipped toppings. Processed cheese and cheese spreads.  Fats and oils  Butter. Stick margarine. Lard. Shortening. Ghee. James fat. Tropical oils, such as coconut, palm kernel, or palm oil.  Seasonings and condiments  Onion salt, garlic salt, seasoned salt, table salt, and sea salt. [facility] sauce. Tartar sauce. Barbecue sauce. Teriyaki sauce. Soy sauce, including reduced-sodium soy sauce. Steak sauce. Canned and packaged gravies. Fish sauce. Oyster sauce. Cocktail sauce. Store-bought horseradish. Ketchup. Mustard. Meat flavorings and tenderizers. Bouillon cubes. Hot sauces. Pre-made or packaged marinades. Pre-made or packaged taco seasonings. Relishes. Regular salad dressings.  Other foods  Salted popcorn and pretzels.  The items listed above may not be all the foods and drinks you should avoid. Talk to a dietitian to learn more.  Where to find more information  National Heart, Lung, and Blood [place] (NHLBI): nhlbi.nih.gov  American  Heart Association (AHA): heart.org  Academy of Nutrition and Dietetics: eatright.org  National Kidney Foundation (NKF): kidney.org  This information is not intended to replace advice given to you by your health care provider. Make sure you discuss any questions you have with your health care provider.  Document Revised: 01/04/2024 Document Reviewed: 01/04/2024  [employer] Patient Education © 2024 [employer]  Insomnia  Insomnia is a sleep disorder that makes it difficult to fall asleep or stay asleep. Insomnia can cause fatigue, low energy, difficulty concentrating, mood swings, and poor performance at work or school.  There are three different ways to classify insomnia:  Difficulty falling asleep.  Difficulty staying asleep.  Waking up too early in the morning.  Any type of insomnia can be long-term (chronic) or short-term (acute). Both are common. Short-term insomnia usually lasts for 3 months or less. Chronic insomnia occurs at least three times a week for longer than 3 months.  What are the causes?  Insomnia may be caused by another condition, situation, or substance, such as:  Having certain mental health conditions, such as anxiety and depression.  Using caffeine, alcohol, tobacco, or drugs.  Having gastrointestinal conditions, such as gastroesophageal reflux disease (GERD).  Having certain medical conditions. These include:  Asthma.  Alzheimer's disease.  Stroke.  Chronic pain.  An overactive thyroid gland (hyperthyroidism).  Other sleep disorders, such as restless legs syndrome and sleep apnea.  Menopause.  Sometimes, the cause of insomnia may not be known.  What increases the risk?  Risk factors for insomnia include:  Gender. Females are affected more often than males.  Age. Insomnia is more common as people get older.  Stress and certain medical and mental health conditions.  Lack of exercise.  Having an irregular work schedule. This may include working night shifts and traveling between different time  zones.  What are the signs or symptoms?  If you have insomnia, the main symptom is having trouble falling asleep or having trouble staying asleep. This may lead to other symptoms, such as:  Feeling tired or having low energy.  Feeling nervous about going to sleep.  Not feeling rested in the morning.  Having trouble concentrating.  Feeling irritable, anxious, or depressed.  How is this diagnosed?  This condition may be diagnosed based on:  Your symptoms and medical history. Your health care provider may ask about:  Your sleep habits.  Any medical conditions you have.  Your mental health.  A physical exam.  How is this treated?  Treatment for insomnia depends on the cause. Treatment may focus on treating an underlying condition that is causing the insomnia. Treatment may also include:  Medicines to help you sleep.  Counseling or therapy.  Lifestyle adjustments to help you sleep better.  Follow these instructions at home:  Eating and drinking    Limit or avoid alcohol, caffeinated beverages, and products that contain nicotine and tobacco, especially close to bedtime. These can disrupt your sleep.  Do not eat a large meal or eat spicy foods right before bedtime. This can lead to digestive discomfort that can make it hard for you to sleep.  Sleep habits    Keep a sleep diary to help you and your health care provider figure out what could be causing your insomnia. Write down:  When you sleep.  When you wake up during the night.  How well you sleep and how rested you feel the next day.  Any side effects of medicines you are taking.  What you eat and drink.  Make your bedroom a dark, comfortable place where it is easy to fall asleep.  Put up shades or blackout curtains to block light from outside.  Use a white noise machine to block noise.  Keep the temperature cool.  Limit screen use before bedtime. This includes:  Not watching TV.  Not using your smartphone, tablet, or computer.  Stick to a routine that includes going to  bed and waking up at the same times every day and night. This can help you fall asleep faster. Consider making a quiet activity, such as reading, part of your nighttime routine.  Try to avoid taking naps during the day so that you sleep better at night.  Get out of bed if you are still awake after 15 minutes of trying to sleep. Keep the lights down, but try reading or doing a quiet activity. When you feel sleepy, go back to bed.  General instructions  Take over-the-counter and prescription medicines only as told by your health care provider.  Exercise regularly as told by your health care provider. However, avoid exercising in the hours right before bedtime.  Use relaxation techniques to manage stress. Ask your health care provider to suggest some techniques that may work well for you. These may include:  Breathing exercises.  Routines to release muscle tension.  Visualizing peaceful scenes.  Make sure that you drive carefully. Do not drive if you feel very sleepy.  Keep all follow-up visits. This is important.  Contact a health care provider if:  You are tired throughout the day.  You have trouble in your daily routine due to sleepiness.  You continue to have sleep problems, or your sleep problems get worse.  Get help right away if:  You have thoughts about hurting yourself or someone else.  Get help right away if you feel like you may hurt yourself or others, or have thoughts about taking your own life. Go to your nearest emergency room or:  Call 911.  Call the National Suicide Prevention Lifeline at 1-686.373.2071 or 583. This is open 24 hours a day.  Text the Crisis Text Line at 972694.  Summary  Insomnia is a sleep disorder that makes it difficult to fall asleep or stay asleep.  Insomnia can be long-term (chronic) or short-term (acute).  Treatment for insomnia depends on the cause. Treatment may focus on treating an underlying condition that is causing the insomnia.  Keep a sleep diary to help you and your  health care provider figure out what could be causing your insomnia.  This information is not intended to replace advice given to you by your health care provider. Make sure you discuss any questions you have with your health care provider.  Document Revised: 11/28/2022 Document Reviewed: 11/28/2022  Elsevier Patient Education © 2024 [employer]

## 2024-07-23 NOTE — PROGRESS NOTES
Chief Complaint   Patient presents with    Hypertension    Insomnia       Subjective   Polo Mcguire is a 19 y.o. male    Hypertension  Pertinent negatives include no chest pain, headaches, palpitations or shortness of breath.   Insomnia  Pertinent negatives include no chest pain, chills, coughing, fatigue, fever, headaches, nausea, rash, vomiting or weakness.     Patient today to follow-up on high blood pressure, weight and insomnia.  He currently still does not check his blood pressure at home but does take his medication, propranolol 40 mg twice a day.  He reports he does not sleep well.  He reports he has trouble going to sleep as well as staying asleep.  He does report he does take duloxetine 30 mg daily and he did have some ability to sleep at first but it seems to have stopped being effective.  His weight is up 4 pounds since February. He has occupational therapy and speech therapy for his post concussive issues.     No Known Allergies  Past Medical History:   Diagnosis Date    Acid reflux     Allergic     Asthma     Migraines       Past Surgical History:   Procedure Laterality Date    HERNIA REPAIR       Social History     Socioeconomic History    Marital status: Single   Tobacco Use    Smoking status: Never    Smokeless tobacco: Never   Vaping Use    Vaping status: Never Used   Substance and Sexual Activity    Alcohol use: Never    Drug use: Never    Sexual activity: Never        Current Outpatient Medications:     Aimovig 70 MG/ML auto-injector, Inject 1 mL under the skin into the appropriate area as directed Every 30 (Thirty) Days., Disp: , Rfl:     DULoxetine (CYMBALTA) 30 MG capsule, Take 1 capsule by mouth Daily., Disp: , Rfl:     fluticasone (FLONASE) 50 MCG/ACT nasal spray, 2 sprays into the nostril(s) as directed by provider Daily., Disp: 18.2 mL, Rfl: 11    loratadine (CLARITIN) 10 MG tablet, Take 1 tablet by mouth Daily., Disp: 90 tablet, Rfl: 3    montelukast (Singulair) 10 MG tablet, Take 1  tablet by mouth Every Night., Disp: 90 tablet, Rfl: 1    propranolol (INDERAL) 40 MG tablet, Take 1 tablet by mouth 2 (Two) Times a Day., Disp: 60 tablet, Rfl: 5    SUMAtriptan (IMITREX) 100 MG tablet, Take 1 tablet by mouth., Disp: , Rfl:     Symbicort 80-4.5 MCG/ACT inhaler, INHALE ONE PUFF BY MOUTH TWO TIMES A DAY, Disp: , Rfl:     QUEtiapine (SEROquel) 25 MG tablet, Take 1 tablet by mouth Every Night., Disp: 30 tablet, Rfl: 2     Review of Systems   Constitutional:  Negative for chills, fatigue, fever and unexpected weight change.   Respiratory:  Negative for cough, chest tightness, shortness of breath and wheezing.    Cardiovascular:  Negative for chest pain, palpitations and leg swelling.   Gastrointestinal:  Negative for constipation, diarrhea, nausea and vomiting.   Genitourinary:  Negative for difficulty urinating and dysuria.   Skin:  Positive for color change (itchy and red in neck area at times, none now). Negative for rash.   Neurological:  Negative for dizziness, syncope, weakness and headaches.   Psychiatric/Behavioral:  Positive for sleep disturbance. The patient is nervous/anxious and has insomnia.        Objective     Vitals:    07/23/24 0958   BP: 120/80   Pulse: 74   Resp: 18   SpO2: 100%         07/23/24  0958   Weight: 106 kg (234 lb 9.6 oz)     Body mass index is 33.44 kg/m².  No results found for this or any previous visit.    Physical Exam  Vitals reviewed.   Constitutional:       Appearance: He is well-developed.   HENT:      Head: Normocephalic and atraumatic.   Cardiovascular:      Rate and Rhythm: Normal rate and regular rhythm.      Heart sounds: Normal heart sounds.   Pulmonary:      Effort: Pulmonary effort is normal.      Breath sounds: Normal breath sounds.   Skin:     General: Skin is warm and dry.   Neurological:      Mental Status: He is alert and oriented to person, place, and time.   Psychiatric:         Behavior: Behavior normal.         Assessment & Plan   Problems Addressed  this Visit          Cardiac and Vasculature    Primary hypertension - Primary    Relevant Medications    propranolol (INDERAL) 40 MG tablet       Endocrine and Metabolic    Class 1 obesity due to excess calories with serious comorbidity and body mass index (BMI) of 33.0 to 33.9 in adult       Mental Health    Anxiety    Relevant Medications    QUEtiapine (SEROquel) 25 MG tablet       Neuro    Post concussion syndrome     Other Visit Diagnoses       Insomnia, unspecified type        Relevant Medications    QUEtiapine (SEROquel) 25 MG tablet    Tremor        Relevant Medications    propranolol (INDERAL) 40 MG tablet          Diagnoses         Codes Comments    Primary hypertension    -  Primary ICD-10-CM: I10  ICD-9-CM: 401.9     Class 1 obesity due to excess calories with serious comorbidity and body mass index (BMI) of 33.0 to 33.9 in adult     ICD-10-CM: E66.09, Z68.33  ICD-9-CM: 278.00, V85.33     Insomnia, unspecified type     ICD-10-CM: G47.00  ICD-9-CM: 780.52     Anxiety     ICD-10-CM: F41.9  ICD-9-CM: 300.00     Tremor     ICD-10-CM: R25.1  ICD-9-CM: 781.0     Post concussion syndrome     ICD-10-CM: F07.81  ICD-9-CM: 310.2         Patient instructed to check blood pressure daily, record, and bring to next visit. If the readings run 140/90 or greater, the patient is to call my office or return sooner for evaluation. Pt advised to eat a heart healthy diet and get regular aerobic exercise.    Discussed need for weight management.  I recommend they use a weight loss jose david on their phone, eat no more than [phone] earline/day, or use intermittent fasting and exercise 30 to 60 minutes 3 times a week.  Discussed weight loss with diet, recommend Weight Watchers or Mediterranean Diet, but stated that whatever method works for this patient is best. The patient agrees with all recommendations at this time. I have discussed a weight loss plan to be determined by this patient.     We will add Seroquel 25 mg at bedtime to see  if this helps insomnia as well as his anxiety issues.    He will have labs collected that were ordered at the last visit.  Will obtain routine labs today and make further recommendations pending results. All lab results are automatically released to [facility] immediately when they return whether they have been reviewed or not. The patient will be notified about the results, regardless of the findings. If they have not been contacted by the office within 2 weeks after the test has been performed, I want them to contact us to learn about the results.    For his postconcussive syndrome I do recommend he continue his workup and treatment as ordered.    Return visit in 3 months    Counseling provided on weight management and hypertension.    Bandardelio Alva, APRN   7/23/2024   10:25 EDT     Please note that portions of this document were completed with a voice recognition program.     At [facility], we believe that sharing information builds trust and better relationships. You are receiving this note because you are receiving care at [facility] or have recently visited. It is possible you will see health information before a provider has talked with you about it. This kind of information can be easy to misunderstand as it is a medical document. It is intended as bfop-sp-tfia communication. It is written in medical language and may contain abbreviations or verbiage that are unfamiliar. It may appear blunt or direct. Medical documents are intended to carry relevant information, facts as evident, and the clinical opinion of the practitioner.  To help you fully understand what it means for your health, we urge you to discuss this note with your provider.

## 2024-07-24 LAB
25(OH)D3 SERPL-MCNC: 21 NG/ML (ref 30–100)
ALBUMIN SERPL-MCNC: 4.4 G/DL (ref 3.5–5.2)
ALBUMIN/GLOB SERPL: 1.6 G/DL
ALP SERPL-CCNC: 83 U/L (ref 39–117)
ALT SERPL W P-5'-P-CCNC: 21 U/L (ref 1–41)
ANION GAP SERPL CALCULATED.3IONS-SCNC: 10.6 MMOL/L (ref 5–15)
AST SERPL-CCNC: 14 U/L (ref 1–40)
BILIRUB SERPL-MCNC: 0.6 MG/DL (ref 0–1.2)
BUN SERPL-MCNC: 11 MG/DL (ref 6–20)
BUN/CREAT SERPL: 12.2 (ref 7–25)
CALCIUM SPEC-SCNC: 9.3 MG/DL (ref 8.6–10.5)
CHLORIDE SERPL-SCNC: 102 MMOL/L (ref 98–107)
CHOLEST SERPL-MCNC: 150 MG/DL (ref 0–200)
CO2 SERPL-SCNC: 25.4 MMOL/L (ref 22–29)
CREAT SERPL-MCNC: 0.9 MG/DL (ref 0.76–1.27)
EGFRCR SERPLBLD CKD-EPI 2021: 125.4 ML/MIN/1.73
GLOBULIN UR ELPH-MCNC: 2.8 GM/DL
GLUCOSE SERPL-MCNC: 83 MG/DL (ref 65–99)
HCV AB SER QL: NORMAL
HDLC SERPL-MCNC: 47 MG/DL (ref 40–60)
LDLC SERPL CALC-MCNC: 88 MG/DL (ref 0–100)
LDLC/HDLC SERPL: 1.87 {RATIO}
POTASSIUM SERPL-SCNC: 4.6 MMOL/L (ref 3.5–5.2)
PROT SERPL-MCNC: 7.2 G/DL (ref 6–8.5)
SODIUM SERPL-SCNC: 138 MMOL/L (ref 136–145)
TRIGL SERPL-MCNC: 75 MG/DL (ref 0–150)
TSH SERPL DL<=0.05 MIU/L-ACNC: 3.96 UIU/ML (ref 0.27–4.2)
VLDLC SERPL-MCNC: 15 MG/DL (ref 5–40)

## 2024-07-26 ENCOUNTER — PRIOR AUTHORIZATION (OUTPATIENT)
Age: 19
End: 2024-07-26
Payer: COMMERCIAL

## 2024-07-26 NOTE — TELEPHONE ENCOUNTER
Key: [id number]     Drug:  QUEtiapine Fumarate 25MG tablets    Sent to plan-Awaiting response  7/26/24

## 2024-07-29 NOTE — TELEPHONE ENCOUNTER
Approved on July 26 by [place] [employer] 2017  The request has been approved. The authorization is effective from 07/26/2024 to 07/26/2025, as long as the member is enrolled in their current health plan.

## 2024-10-23 ENCOUNTER — OFFICE VISIT (OUTPATIENT)
Age: 19
End: 2024-10-23
Payer: COMMERCIAL

## 2024-10-23 VITALS
SYSTOLIC BLOOD PRESSURE: 124 MMHG | OXYGEN SATURATION: 98 % | BODY MASS INDEX: 33.71 KG/M2 | WEIGHT: 235.5 LBS | HEART RATE: 74 BPM | RESPIRATION RATE: 18 BRPM | DIASTOLIC BLOOD PRESSURE: 82 MMHG | HEIGHT: 70 IN

## 2024-10-23 DIAGNOSIS — G47.00 INSOMNIA, UNSPECIFIED TYPE: ICD-10-CM

## 2024-10-23 DIAGNOSIS — Z23 NEED FOR IMMUNIZATION AGAINST INFLUENZA: ICD-10-CM

## 2024-10-23 DIAGNOSIS — I10 PRIMARY HYPERTENSION: Primary | ICD-10-CM

## 2024-10-23 DIAGNOSIS — E66.09 CLASS 1 OBESITY DUE TO EXCESS CALORIES WITH SERIOUS COMORBIDITY AND BODY MASS INDEX (BMI) OF 33.0 TO 33.9 IN ADULT: ICD-10-CM

## 2024-10-23 DIAGNOSIS — J45.41 MODERATE PERSISTENT ASTHMA WITH ACUTE EXACERBATION: ICD-10-CM

## 2024-10-23 DIAGNOSIS — J30.2 SEASONAL ALLERGIC RHINITIS, UNSPECIFIED TRIGGER: ICD-10-CM

## 2024-10-23 DIAGNOSIS — E66.811 CLASS 1 OBESITY DUE TO EXCESS CALORIES WITH SERIOUS COMORBIDITY AND BODY MASS INDEX (BMI) OF 33.0 TO 33.9 IN ADULT: ICD-10-CM

## 2024-10-23 DIAGNOSIS — E55.9 VITAMIN D DEFICIENCY: ICD-10-CM

## 2024-10-23 PROCEDURE — 90656 IIV3 VACC NO PRSV 0.5 ML IM: CPT | Performed by: NURSE PRACTITIONER

## 2024-10-23 PROCEDURE — 1159F MED LIST DOCD IN RCRD: CPT | Performed by: NURSE PRACTITIONER

## 2024-10-23 PROCEDURE — 99214 OFFICE O/P EST MOD 30 MIN: CPT | Performed by: NURSE PRACTITIONER

## 2024-10-23 PROCEDURE — 3074F SYST BP LT 130 MM HG: CPT | Performed by: NURSE PRACTITIONER

## 2024-10-23 PROCEDURE — 3079F DIAST BP 80-89 MM HG: CPT | Performed by: NURSE PRACTITIONER

## 2024-10-23 PROCEDURE — 90471 IMMUNIZATION ADMIN: CPT | Performed by: NURSE PRACTITIONER

## 2024-10-23 PROCEDURE — 1160F RVW MEDS BY RX/DR IN RCRD: CPT | Performed by: NURSE PRACTITIONER

## 2024-10-23 RX ORDER — FLUTICASONE FUROATE, UMECLIDINIUM BROMIDE AND VILANTEROL TRIFENATATE 100; 62.5; 25 UG/1; UG/1; UG/1
1 POWDER RESPIRATORY (INHALATION)
Qty: 60 EACH | Refills: 5 | Status: SHIPPED | OUTPATIENT
Start: 2024-10-23

## 2024-10-23 RX ORDER — LORATADINE 10 MG/1
10 TABLET ORAL DAILY
Qty: 90 TABLET | Refills: 3 | Status: SHIPPED | OUTPATIENT
Start: 2024-10-23

## 2024-10-23 RX ORDER — MONTELUKAST SODIUM 10 MG/1
10 TABLET ORAL NIGHTLY
Qty: 90 TABLET | Refills: 1 | Status: SHIPPED | OUTPATIENT
Start: 2024-10-23

## 2024-10-23 RX ORDER — GUAIFENESIN 600 MG/1
1200 TABLET, EXTENDED RELEASE ORAL 2 TIMES DAILY
Qty: 60 TABLET | Refills: 2 | Status: SHIPPED | OUTPATIENT
Start: 2024-10-23

## 2024-10-23 RX ORDER — FLUTICASONE PROPIONATE 50 UG/1
2 SPRAY, METERED NASAL DAILY
Qty: 18.2 ML | Refills: 11 | Status: SHIPPED | OUTPATIENT
Start: 2024-10-23

## 2024-10-23 RX ORDER — PREDNISONE 20 MG/1
40 TABLET ORAL DAILY
Qty: 14 TABLET | Refills: 0 | Status: SHIPPED | OUTPATIENT
Start: 2024-10-23

## 2024-10-23 NOTE — PATIENT INSTRUCTIONS
Obesity, Adult  Obesity is the condition of having too much total body fat. Being overweight or obese means that your weight is greater than what is considered healthy for your body size. Obesity is determined by a measurement called BMI (body mass index). BMI is an estimate of body fat and is calculated from height and weight. For adults, a BMI of 30 or higher is considered obese.  Obesity can lead to other health concerns and major illnesses, including:  Stroke.  Coronary artery disease (CAD).  Type 2 diabetes.  Some types of cancer, including cancers of the colon, breast, uterus, and gallbladder.  High blood pressure (hypertension).  High cholesterol.  Gallbladder stones.  Obesity can also contribute to:  Osteoarthritis.  Sleep apnea.  Infertility problems.  What are the causes?  Common causes of this condition include:  Eating daily meals that are high in calories, sugar, and fat.  Drinking high amounts of sugar-sweetened beverages, such as soft drinks.  Being born with genes that may make you more likely to become obese.  Having a medical condition that causes obesity, including:  Hypothyroidism.  Polycystic ovarian syndrome (PCOS).  Binge-eating disorder.  Cushing syndrome.  Taking certain medicines, such as steroids, antidepressants, and seizure medicines.  Not being physically active (sedentary lifestyle).  Not getting enough sleep.  What increases the risk?  The following factors may make you more likely to develop this condition:  Having a family history of obesity.  Living in an area with limited access to:  [place], recreation centers, or sidewalks.  Healthy food choices, such as grocery stores and [employer] markets.  What are the signs or symptoms?  The main sign of this condition is having too much body fat.  How is this diagnosed?  This condition is diagnosed based on:  Your BMI. If you are an adult with a BMI of 30 or higher, you are considered obese.  Your waist circumference. This measures the  distance around your waistline.  Your skinfold thickness. Your health care provider may gently pinch a fold of your skin and measure it.  You may have other tests to check for underlying conditions.  How is this treated?  Treatment for this condition often includes changing your lifestyle. Treatment may include some or all of the following:  Dietary changes. This may include developing a healthy meal plan.  Regular physical activity. This may include activity that causes your heart to beat faster (aerobic exercise) and strength training. Work with your health care provider to design an exercise program that works for you.  Medicine to help you lose weight if you are unable to lose one pound a week after six weeks of healthy eating and more physical activity.  Treating conditions that cause the obesity (underlying conditions).  Surgery. Surgical options may include gastric banding and gastric bypass. Surgery may be done if:  Other treatments have not helped to improve your condition.  You have a BMI of 40 or higher.  You have life-threatening health problems related to obesity.  Follow these instructions at home:  Eating and drinking    Follow recommendations from your health care provider about what you eat and drink. Your health care provider may advise you to:  Limit fast food, sweets, and processed snack foods.  Choose low-fat options, such as low-fat milk instead of whole milk.  Eat five or more servings of fruits or vegetables every day.  Choose healthy foods when you eat out.  Keep low-fat snacks available.  Limit sugary drinks, such as soda, fruit juice, sweetened iced tea, and flavored milk.  Drink enough water to keep your urine pale yellow.  Do not follow a fad diet. Fad diets can be unhealthy and even dangerous.  Other healthful choices include:  Eat at home more often. This gives you more control over what you eat.  Learn to read food labels. This will help you understand how much food is considered one  serving.  Learn what a healthy serving size is.  Physical activity  Exercise regularly, as told by your health care provider.  Most adults should get up to 150 minutes of moderate-intensity exercise every week.  Ask your health care provider what types of exercise are safe for you and how often you should exercise.  Warm up and stretch before being active.  Cool down and stretch after being active.  Rest between periods of activity.  Lifestyle  Work with your health care provider and a dietitian to set a weight-loss goal that is healthy and reasonable for you.  Limit your screen time.  Find ways to reward yourself that do not involve food.  Do not drink alcohol if:  Your health care provider tells you not to drink.  You are pregnant, may be pregnant, or are planning to become pregnant.  If you drink alcohol:  Limit how much you have to:  0-1 drink a day for women.  0-2 drinks a day for men.  Know how much alcohol is in your drink. In the [place] one drink equals one 12 oz bottle of beer (355 mL), one 5 oz glass of wine (148 mL), or one 1½ oz glass of hard liquor (44 mL).  General instructions  Keep a weight-loss journal to keep track of the food you eat and how much exercise you get.  Take over-the-counter and prescription medicines only as told by your health care provider.  Take vitamins and supplements only as told by your health care provider.  Consider joining a support group. Your health care provider may be able to recommend a support group.  Pay attention to your mental health as obesity can lead to depression or self esteem issues.  Keep all follow-up visits. This is important.  Contact a health care provider if:  You are unable to meet your weight-loss goal after six weeks of dietary and lifestyle changes.  You have trouble breathing.  Summary  Obesity is the condition of having too much total body fat.  Being overweight or obese means that your weight is greater than what is considered healthy for your body  size.  Work with your health care provider and a dietitian to set a weight-loss goal that is healthy and reasonable for you.  Exercise regularly, as told by your health care provider. Ask your health care provider what types of exercise are safe for you and how often you should exercise.  This information is not intended to replace advice given to you by your health care provider. Make sure you discuss any questions you have with your health care provider.  Document Revised: 07/26/2022 Document Reviewed: 07/26/2022  [employer] Patient Education © 2024 [employer]  Managing Your Hypertension  Hypertension, also called high blood pressure, is when the force of the blood pressing against the walls of the arteries is too strong. Arteries are blood vessels that carry blood from your heart throughout your body. Hypertension forces the heart to work harder to pump blood and may cause the arteries to become narrow or stiff.  Understanding blood pressure readings  A blood pressure reading includes a higher number over a lower number:  The first, or top, number is called the systolic pressure. It is a measure of the pressure in your arteries as your heart beats.  The second, or bottom number, is called the diastolic pressure. It is a measure of the pressure in your arteries as the heart relaxes.  For most people, a normal blood pressure is below 120/80. Your personal target blood pressure may vary depending on your medical conditions, your age, and other factors.  Blood pressure is classified into four stages. Based on your blood pressure reading, your health care provider may use the following stages to determine what type of treatment you need, if any. Systolic pressure and diastolic pressure are measured in a unit called millimeters of mercury (mmHg).  Normal  Systolic pressure: below 120.  Diastolic pressure: below 80.  Elevated  Systolic pressure: 120-129.  Diastolic pressure: below 80.  Hypertension stage 1  Systolic  pressure: 130-139.  Diastolic pressure: 80-89.  Hypertension stage 2  Systolic pressure: 140 or above.  Diastolic pressure: 90 or above.  How can this condition affect me?  Managing your hypertension is very important. Over time, hypertension can damage the arteries and decrease blood flow to parts of the body, including the brain, heart, and kidneys. Having untreated or uncontrolled hypertension can lead to:  A heart attack.  A stroke.  A weakened blood vessel (aneurysm).  Heart failure.  Kidney damage.  Eye damage.  Memory and concentration problems.  Vascular dementia.  What actions can I take to manage this condition?  Hypertension can be managed by making lifestyle changes and possibly by taking medicines. Your health care provider will help you make a plan to bring your blood pressure within a normal range. You may be referred for counseling on a healthy diet and physical activity.  Nutrition    Eat a diet that is high in fiber and potassium, and low in salt (sodium), added sugar, and fat. An example eating plan is called the DASH diet. DASH stands for Dietary Approaches to Stop Hypertension. To eat this way:  Eat plenty of fresh fruits and vegetables. Try to fill one-half of your plate at each meal with fruits and vegetables.  Eat whole grains, such as whole-wheat pasta, brown rice, or whole-grain bread. Fill about one-fourth of your plate with whole grains.  Eat low-fat dairy products.  Avoid fatty cuts of meat, processed or cured meats, and poultry with skin. Fill about one-fourth of your plate with lean proteins such as fish, chicken without skin, beans, eggs, and tofu.  Avoid pre-made and processed foods. These tend to be higher in sodium, added sugar, and fat.  Reduce your daily sodium intake. Many people with hypertension should eat less than 1,500 mg of sodium a day.  Lifestyle    Work with your health care provider to maintain a healthy body weight or to lose weight. Ask what an ideal weight is for  you.  Get at least 30 minutes of exercise that causes your heart to beat faster (aerobic exercise) most days of the week. Activities may include walking, swimming, or biking.  Include exercise to strengthen your muscles (resistance exercise), such as weight lifting, as part of your weekly exercise routine. Try to do these types of exercises for 30 minutes at least 3 days a week.  Do not use any products that contain nicotine or tobacco. These products include cigarettes, chewing tobacco, and vaping devices, such as e-cigarettes. If you need help quitting, ask your health care provider.  Control any long-term (chronic) conditions you have, such as high cholesterol or diabetes.  Identify your sources of stress and find ways to manage stress. This may include meditation, deep breathing, or making time for fun activities.  Alcohol use  Do not drink alcohol if:  Your health care provider tells you not to drink.  You are pregnant, may be pregnant, or are planning to become pregnant.  If you drink alcohol:  Limit how much you have to:  0-1 drink a day for women.  0-2 drinks a day for men.  Know how much alcohol is in your drink. In the [place] one drink equals one 12 oz bottle of beer (355 mL), one 5 oz glass of wine (148 mL), or one 1½ oz glass of hard liquor (44 mL).  Medicines  Your health care provider may prescribe medicine if lifestyle changes are not enough to get your blood pressure under control and if:  Your systolic blood pressure is 130 or higher.  Your diastolic blood pressure is 80 or higher.  Take medicines only as told by your health care provider. Follow the directions carefully. Blood pressure medicines must be taken as told by your health care provider. The medicine does not work as well when you skip doses. Skipping doses also puts you at risk for problems.  Monitoring  Before you monitor your blood pressure:  Do not smoke, drink caffeinated beverages, or exercise within 30 minutes before taking a  measurement.  Use the bathroom and empty your bladder (urinate).  Sit quietly for at least 5 minutes before taking measurements.  Monitor your blood pressure at home as told by your health care provider. To do this:  Sit with your back straight and supported.  Place your feet flat on the floor. Do not cross your legs.  Support your arm on a flat surface, such as a table. Make sure your upper arm is at heart level.  Each time you measure, take two or three readings one minute apart and record the results.  You may also need to have your blood pressure checked regularly by your health care provider.  General information  Talk with your health care provider about your diet, exercise habits, and other lifestyle factors that may be contributing to hypertension.  Review all the medicines you take with your health care provider because there may be side effects or interactions.  Keep all follow-up visits. Your health care provider can help you create and adjust your plan for managing your high blood pressure.  Where to find more information  National Heart, Lung, and Blood [place]: www.nhlbi.nih.gov  American Heart Association: www.heart.org  Contact a health care provider if:  You think you are having a reaction to medicines you have taken.  You have repeated (recurrent) headaches.  You feel dizzy.  You have swelling in your ankles.  You have trouble with your vision.  Get help right away if:  You develop a severe headache or confusion.  You have unusual weakness or numbness, or you feel faint.  You have severe pain in your chest or abdomen.  You vomit repeatedly.  You have trouble breathing.  These symptoms may be an emergency. Get help right away. Call 911.  Do not wait to see if the symptoms will go away.  Do not drive yourself to the hospital.  Summary  Hypertension is when the force of blood pumping through your arteries is too strong. If this condition is not controlled, it may put you at risk for serious  "complications.  Your personal target blood pressure may vary depending on your medical conditions, your age, and other factors. For most people, a normal blood pressure is less than 120/80.  Hypertension is managed by lifestyle changes, medicines, or both.  Lifestyle changes to help manage hypertension include losing weight, eating a healthy, low-sodium diet, exercising more, stopping smoking, and limiting alcohol.  This information is not intended to replace advice given to you by your health care provider. Make sure you discuss any questions you have with your health care provider.  Document Revised: 09/01/2022 Document Reviewed: 09/01/2022  [employer] Patient Education © 2024 [employer]  DASH Eating Plan  DASH stands for Dietary Approaches to Stop Hypertension. The DASH eating plan is a healthy eating plan that has been shown to:  Lower high blood pressure (hypertension).  Reduce your risk for type 2 diabetes, heart disease, and stroke.  Help with weight loss.  What are tips for following this plan?  Reading food labels  Check food labels for the amount of salt (sodium) per serving. Choose foods with less than 5 percent of the Daily Value (DV) of sodium. In general, foods with less than 300 milligrams (mg) of sodium per serving fit into this eating plan.  To find whole grains, look for the word \"whole\" as the first word in the ingredient list.  Shopping  Buy products labeled as \"low-sodium\" or \"no salt added.\"  Buy fresh foods. Avoid canned foods and pre-made or frozen meals.  Cooking  Try not to add salt when you cook. Use salt-free seasonings or herbs instead of table salt or sea salt. Check with your health care provider or pharmacist before using salt substitutes.  Do not santiago foods. Cook foods in healthy ways, such as baking, boiling, grilling, roasting, or broiling.  Cook using oils that are good for your heart. These include olive, canola, avocado, soybean, and sunflower oil.  Meal planning    Eat a " balanced diet. This should include:  4 or more servings of fruits and 4 or more servings of vegetables each day. Try to fill half of your plate with fruits and vegetables.  6-8 servings of whole grains each day.  6 or less servings of lean meat, poultry, or fish each day. 1 oz is 1 serving. A 3 oz (85 g) serving of meat is about the same size as the palm of your hand. One egg is 1 oz (28 g).  2-3 servings of low-fat dairy each day. One serving is 1 cup (237 mL).  1 serving of nuts, seeds, or beans 5 times each week.  2-3 servings of heart-healthy fats. Healthy fats called omega-3 fatty acids are found in foods such as walnuts, flaxseeds, fortified milks, and eggs. These fats are also found in cold-water fish, such as sardines, salmon, and mackerel.  Limit how much you eat of:  Canned or prepackaged foods.  Food that is high in trans fat, such as fried foods.  Food that is high in saturated fat, such as fatty meat.  Desserts and other sweets, sugary drinks, and other foods with added sugar.  Full-fat dairy products.  Do not salt foods before eating.  Do not eat more than 4 egg yolks a week.  Try to eat at least 2 vegetarian meals a week.  Eat more home-cooked food and less restaurant, buffet, and fast food.  Lifestyle  When eating at a restaurant, ask if your food can be made with less salt or no salt.  If you drink alcohol:  Limit how much you have to:  0-1 drink a day if you are female.  0-2 drinks a day if you are male.  Know how much alcohol is in your drink. In the [place] one drink is one 12 oz bottle of beer (355 mL), one 5 oz glass of wine (148 mL), or one 1½ oz glass of hard liquor (44 mL).  General information  Avoid eating more than 2,300 mg of salt a day. If you have hypertension, you may need to reduce your sodium intake to 1,500 mg a day.  Work with your provider to stay at a healthy body weight or lose weight. Ask what the best weight range is for you.  On most days of the week, get at least 30 minutes  of exercise that causes your heart to beat faster. This may include walking, swimming, or biking.  Work with your provider or dietitian to adjust your eating plan to meet your specific calorie needs.  What foods should I eat?  Fruits  All fresh, dried, or frozen fruit. Canned fruits that are in their natural juice and do not have sugar added to them.  Vegetables  Fresh or frozen vegetables that are raw, steamed, roasted, or grilled. Low-sodium or reduced-sodium tomato and vegetable juice. Low-sodium or reduced-sodium tomato sauce and tomato paste. Low-sodium or reduced-sodium canned vegetables.  Grains  Whole-grain or whole-wheat bread. Whole-grain or whole-wheat pasta. Brown rice. Oatmeal. Quinoa. Bulgur. Whole-grain and low-sodium cereals. [place] bread. Low-fat, low-sodium crackers. Whole-wheat flour tortillas.  Meats and other proteins  Skinless chicken or turkey. Ground chicken or turkey. Pork with fat trimmed off. Fish and seafood. Egg whites. Dried beans, peas, or lentils. Unsalted nuts, nut butters, and seeds. Unsalted canned beans. Lean cuts of beef with fat trimmed off. Low-sodium, lean precooked or cured meat, such as sausages or meat loaves.  Dairy  Low-fat (1%) or fat-free (skim) milk. Reduced-fat, low-fat, or fat-free cheeses. Nonfat, low-sodium ricotta or cottage cheese. Low-fat or nonfat yogurt. Low-fat, low-sodium cheese.  Fats and oils  Soft margarine without trans fats. Vegetable oil. Reduced-fat, low-fat, or light mayonnaise and salad dressings (reduced-sodium). Canola, safflower, olive, avocado, soybean, and sunflower oils. Avocado.  Seasonings and condiments  Herbs. Spices. Seasoning mixes without salt.  Other foods  Unsalted popcorn and pretzels. Fat-free sweets.  The items listed above may not be all the foods and drinks you can have. Talk to a dietitian to learn more.  What foods should I avoid?  Fruits  Canned fruit in a light or heavy syrup. Fried fruit. Fruit in cream or butter  sauce.  Vegetables  Creamed or fried vegetables. Vegetables in a cheese sauce. Regular canned vegetables that are not marked as low-sodium or reduced-sodium. Regular canned tomato sauce and paste that are not marked as low-sodium or reduced-sodium. Regular tomato and vegetable juices that are not marked as low-sodium or reduced-sodium. Pickles. Olives.  Grains  Baked goods made with fat, such as croissants, muffins, or some breads. Dry pasta or rice meal packs.  Meats and other proteins  Fatty cuts of meat. Ribs. Fried meat. James. Bologna, salami, and other precooked or cured meats, such as sausages or meat loaves, that are not lean and low in sodium. Fat from the back of a pig (fatback). Bratwurst. Salted nuts and seeds. Canned beans with added salt. Canned or smoked fish. Whole eggs or egg yolks. Chicken or turkey with skin.  Dairy  Whole or 2% milk, cream, and half-and-half. Whole or full-fat cream cheese. Whole-fat or sweetened yogurt. Full-fat cheese. Nondairy creamers. Whipped toppings. Processed cheese and cheese spreads.  Fats and oils  Butter. Stick margarine. Lard. Shortening. Ghee. James fat. Tropical oils, such as coconut, palm kernel, or palm oil.  Seasonings and condiments  Onion salt, garlic salt, seasoned salt, table salt, and sea salt. [facility] sauce. Tartar sauce. Barbecue sauce. Teriyaki sauce. Soy sauce, including reduced-sodium soy sauce. Steak sauce. Canned and packaged gravies. Fish sauce. Oyster sauce. Cocktail sauce. Store-bought horseradish. Ketchup. Mustard. Meat flavorings and tenderizers. Bouillon cubes. Hot sauces. Pre-made or packaged marinades. Pre-made or packaged taco seasonings. Relishes. Regular salad dressings.  Other foods  Salted popcorn and pretzels.  The items listed above may not be all the foods and drinks you should avoid. Talk to a dietitian to learn more.  Where to find more information  National Heart, Lung, and Blood [place] (NHLBI): nhlbi.nih.gov  American  Heart Association (AHA): heart.org  Academy of Nutrition and Dietetics: eatright.org  National Kidney Foundation (NKF): kidney.org  This information is not intended to replace advice given to you by your health care provider. Make sure you discuss any questions you have with your health care provider.  Document Revised: 01/04/2024 Document Reviewed: 01/04/2024  [employer] Patient Education © 2024 [employer]  Allergic Rhinitis, Adult    Allergic rhinitis is an allergic reaction that affects the mucous membrane inside the nose. The mucous membrane is the tissue that produces mucus.  There are two types of allergic rhinitis:  Seasonal. This type is also called hay fever and happens only during certain seasons.  Perennial. This type can happen at any time of the year.  Allergic rhinitis cannot be spread from person to person. This condition can be mild, bad, or very bad. It can develop at any age and may be outgrown.  What are the causes?  This condition is caused by allergens. These are things that can cause an allergic reaction. Allergens may differ for seasonal allergic rhinitis and perennial allergic rhinitis.  Seasonal allergic rhinitis is caused by pollen. Pollen can come from grasses, trees, and weeds.  Perennial allergic rhinitis may be caused by:  Dust mites.  Proteins in a pet's pee (urine), saliva, or dander. Dander is dead skin cells from a pet.  Smoke, mold, or car fumes.  Remains of or waste from insects such as cockroaches.  What increases the risk?  You are more likely to develop this condition if you have a family history of allergies or other conditions related to allergies, including:  Allergic conjunctivitis. This is irritation and swelling of parts of the eyes and eyelids.  Asthma. This condition affects the lungs and makes it hard to breathe.  Atopic dermatitis or eczema. This is long term (chronic) irritation and swelling of the skin.  Food allergies.  What are the signs or symptoms?  Symptoms of  this condition include:  Sneezing or coughing.  A stuffy nose (nasal congestion), itchy nose, or nasal discharge.  Itchy eyes and tearing of the eyes.  A feeling of mucus dripping down the back of your throat (postnasal drip). This may cause a sore throat.  Trouble sleeping.  Tiredness.  Headache.  How is this diagnosed?  This condition may be diagnosed with your symptoms, your medical history, and a physical exam. Your health care provider may check for related conditions, such as:  Asthma.  Pink eye. This is eye swelling and irritation caused by infection (conjunctivitis).  Ear infection.  Upper respiratory infection. This is an infection in the nose, throat, or upper airways.  You may also have tests to find out which allergens cause your symptoms. These may include skin tests or blood tests.  How is this treated?  There is no cure for this condition, but treatment can help control symptoms. Treatment may include:  Taking medicines that block allergy symptoms, such as corticosteroids (anti-inflammatories) and antihistamines. Medicine may be given as a shot, nasal spray, or pill.  Avoiding any allergens.  Being exposed again and again to tiny amounts of allergens to help you build a defense against allergens (allergenimmunotherapy). This is done if other treatments have not helped. It may include:  Allergy shots. These are injected medicines that have small amounts of an allergen in them.  Sublingual immunotherapy. This involves taking small doses of a medicine with an allergen in it under your tongue.  If these treatments do not work, your provider may prescribe newer, stronger medicines.  Follow these instructions at home:  Avoiding allergens  Find out what you are allergic to and avoid those allergens. These are some things you can do to help avoid allergens:  If you have perennial allergies:  Replace carpet with wood, tile, or vinyl tim. Carpet can trap dander and dust.  Do not smoke. Do not allow smoking  in your home  Change your heating and air conditioning filters at least once a month.  If you have seasonal allergies, take these steps during allergy season:  Keep windows closed as much as possible.  Plan outdoor activities when pollen counts are lowest. Check pollen counts before you plan outdoor activities  When coming indoors, change clothing and shower before sitting on furniture or bedding.  If you have a pet in the house that produces allergens:  Keep the pet out of the bedroom.  Vacuum, sweep, and dust regularly.  General instructions  Take over-the-counter and prescription medicines only as told by your provider.  Drink enough fluid to keep your pee pale yellow.  Where to find more information  American Academy of Allergy, Asthma & Immunology: aaaai.org  Contact a health care provider if:  You have a fever.  You develop a cough that does not go away.  You make high-pitched whistling sounds when you breathe, most often when you breathe out (wheeze).  Your symptoms slow you down or stop you from doing your normal activities each day.  Get help right away if:  You have shortness of breath.  This symptom may be an emergency. Get help right away. Call 911.  Do not wait to see if the symptoms will go away.  Do not drive yourself to the hospital.  This information is not intended to replace advice given to you by your health care provider. Make sure you discuss any questions you have with your health care provider.  Document Revised: 08/28/2023 Document Reviewed: 08/28/2023  Elsevier Patient Education © 2024 [employer]

## 2024-10-23 NOTE — PROGRESS NOTES
Chief Complaint   Patient presents with    Hypertension    Insomnia    Obesity    Vitamin D Deficiency    sleep walking       Subjective   Polo Mcguire is a 19 y.o. male    History of Present Illness  Patient had follow-up on high blood pressure, insomnia, sleepwalking, weight and vitamin D deficiency. His weight is up 1 pound from the last visit and up 11 pounds since March. He had labs collected about 3 months ago and at that time his vitamin D was low at 21.0.  We did recommend over-the-counter vitamin D3. He is not taking.  His white blood cells were 11.17, hematocrit was 51.7.  Thyroid labs were normal as well as a lipid panel.  His glucose kidney function and liver function were also normal. We gave him Seroquel 25 mg for insomnia, he does sleep but is sleep walking. He reports a hard time breathing, using Inhalers, Symbicort and albuterol- using a lot due to asthma exacerbation. A lot of allergic symptoms, He does now see Neuro for headaches.     No Known Allergies  Past Medical History:   Diagnosis Date    Acid reflux     Allergic     Asthma     Migraines       Past Surgical History:   Procedure Laterality Date    HERNIA REPAIR       Social History     Socioeconomic History    Marital status: Single   Tobacco Use    Smoking status: Never    Smokeless tobacco: Never   Vaping Use    Vaping status: Never Used   Substance and Sexual Activity    Alcohol use: Never    Drug use: Never    Sexual activity: Never        Current Outpatient Medications:     Aimovig 70 MG/ML auto-injector, Inject 1 mL under the skin into the appropriate area as directed Every 30 (Thirty) Days., Disp: , Rfl:     DULoxetine (CYMBALTA) 30 MG capsule, Take 1 capsule by mouth Daily., Disp: , Rfl:     fluticasone (FLONASE) 50 MCG/ACT nasal spray, Administer 2 sprays into the nostril(s) as directed by provider Daily., Disp: 18.2 mL, Rfl: 11    loratadine (CLARITIN) 10 MG tablet, Take 1 tablet by mouth Daily., Disp: 90 tablet, Rfl: 3     montelukast (Singulair) 10 MG tablet, Take 1 tablet by mouth Every Night., Disp: 90 tablet, Rfl: 1    propranolol (INDERAL) 40 MG tablet, Take 1 tablet by mouth 2 (Two) Times a Day., Disp: 60 tablet, Rfl: 5    SUMAtriptan (IMITREX) 100 MG tablet, Take 1 tablet by mouth., Disp: , Rfl:     Fluticasone-Umeclidin-Vilant (Trelegy Ellipta) 100-62.5-25 MCG/ACT inhaler, Inhale 1 puff Daily., Disp: 60 each, Rfl: 5    guaiFENesin (Mucinex) 600 MG 12 hr tablet, Take 2 tablets by mouth 2 (Two) Times a Day., Disp: 60 tablet, Rfl: 2    predniSONE (DELTASONE) 20 MG tablet, Take 2 tablets by mouth Daily., Disp: 14 tablet, Rfl: 0     Review of Systems   Constitutional:  Negative for chills, fatigue, fever and unexpected weight change.   Respiratory:  Positive for cough, shortness of breath and wheezing. Negative for chest tightness.    Cardiovascular:  Negative for chest pain, palpitations and leg swelling.   Gastrointestinal:  Negative for constipation, diarrhea, nausea and vomiting.   Genitourinary:  Negative for difficulty urinating and dysuria.   Skin:  Negative for color change and rash.   Neurological:  Negative for dizziness, syncope, weakness and headaches.   Psychiatric/Behavioral:  Negative for sleep disturbance.        Objective     Vitals:    10/23/24 1102   BP: 124/82   Pulse: 74   Resp: 18   SpO2: 98%         10/23/24  1102   Weight: 107 kg (235 lb 8 oz)     Body mass index is 33.56 kg/m².  Results for orders placed or performed in visit on 07/23/24   Comprehensive Metabolic Panel    Collection Time: 07/23/24 10:29 AM    Specimen: Arm, Left; Blood   Result Value Ref Range    Glucose 83 65 - 99 mg/dL    BUN 11 6 - 20 mg/dL    Creatinine 0.90 0.76 - 1.27 mg/dL    Sodium 138 136 - 145 mmol/L    Potassium 4.6 3.5 - 5.2 mmol/L    Chloride 102 98 - 107 mmol/L    CO2 25.4 22.0 - 29.0 mmol/L    Calcium 9.3 8.6 - 10.5 mg/dL    Total Protein 7.2 6.0 - 8.5 g/dL    Albumin 4.4 3.5 - 5.2 g/dL    ALT (SGPT) 21 1 - 41 U/L    AST  (SGOT) 14 1 - 40 U/L    Alkaline Phosphatase 83 39 - 117 U/L    Total Bilirubin 0.6 0.0 - 1.2 mg/dL    Globulin 2.8 gm/dL    A/G Ratio 1.6 g/dL    BUN/Creatinine Ratio 12.2 7.0 - 25.0    Anion Gap 10.6 5.0 - 15.0 mmol/L    eGFR 125.4 >60.0 mL/min/1.73   Lipid Panel    Collection Time: 07/23/24 10:29 AM    Specimen: Arm, Left; Blood   Result Value Ref Range    Total Cholesterol 150 0 - 200 mg/dL    Triglycerides 75 0 - 150 mg/dL    HDL Cholesterol 47 40 - 60 mg/dL    LDL Cholesterol  88 0 - 100 mg/dL    VLDL Cholesterol 15 5 - 40 mg/dL    LDL/HDL Ratio 1.87    Vitamin D,25-Hydroxy    Collection Time: 07/23/24 10:29 AM    Specimen: Arm, Left; Blood   Result Value Ref Range    25 Hydroxy, Vitamin D 21.0 (L) 30.0 - 100.0 ng/ml   TSH Rfx On Abnormal To Free T4    Collection Time: 07/23/24 10:29 AM    Specimen: Arm, Left; Blood   Result Value Ref Range    TSH 3.960 0.270 - 4.200 uIU/mL   CBC (No Diff)    Collection Time: 07/23/24 10:29 AM    Specimen: Arm, Left; Blood   Result Value Ref Range    WBC 11.17 (H) 3.40 - 10.80 10*3/mm3    RBC 5.77 4.14 - 5.80 10*6/mm3    Hemoglobin 16.8 13.0 - 17.7 g/dL    Hematocrit 51.7 (H) 37.5 - 51.0 %    MCV 89.6 79.0 - 97.0 fL    MCH 29.1 26.6 - 33.0 pg    MCHC 32.5 31.5 - 35.7 g/dL    RDW 12.9 12.3 - 15.4 %    RDW-SD 42.1 37.0 - 54.0 fl    MPV 9.9 6.0 - 12.0 fL    Platelets 270 140 - 450 10*3/mm3   Hepatitis C Antibody    Collection Time: 07/23/24 10:29 AM    Specimen: Arm, Left; Blood   Result Value Ref Range    Hepatitis C Ab Non-Reactive Non-Reactive       Physical Exam  Vitals reviewed.   Constitutional:       Appearance: He is well-developed.   HENT:      Head: Normocephalic and atraumatic.   Cardiovascular:      Rate and Rhythm: Normal rate and regular rhythm.      Heart sounds: Normal heart sounds.   Pulmonary:      Effort: Pulmonary effort is normal.      Breath sounds: Normal breath sounds.   Skin:     General: Skin is warm and dry.   Neurological:      Mental Status: He is  alert and oriented to person, place, and time.   Psychiatric:         Behavior: Behavior normal.         Assessment & Plan   Problems Addressed this Visit          Cardiac and Vasculature    Primary hypertension - Primary       Endocrine and Metabolic    Class 1 obesity due to excess calories with serious comorbidity and body mass index (BMI) of 33.0 to 33.9 in adult    Vitamin D deficiency       Pulmonary and Pneumonias    Moderate persistent asthma without complication    Relevant Medications    Fluticasone-Umeclidin-Vilant (Trelegy Ellipta) 100-62.5-25 MCG/ACT inhaler    predniSONE (DELTASONE) 20 MG tablet    montelukast (Singulair) 10 MG tablet       Sleep    Insomnia     Other Visit Diagnoses       Seasonal allergic rhinitis, unspecified trigger        Relevant Medications    predniSONE (DELTASONE) 20 MG tablet    loratadine (CLARITIN) 10 MG tablet    montelukast (Singulair) 10 MG tablet    fluticasone (FLONASE) 50 MCG/ACT nasal spray    guaiFENesin (Mucinex) 600 MG 12 hr tablet    Need for immunization against influenza        Relevant Orders    Fluzone >6mos ([id number]) (Completed)          Diagnoses         Codes Comments    Primary hypertension    -  Primary ICD-10-CM: I10  ICD-9-CM: 401.9     Class 1 obesity due to excess calories with serious comorbidity and body mass index (BMI) of 33.0 to 33.9 in adult     ICD-10-CM: E66.811, E66.09, Z68.33  ICD-9-CM: 278.00, V85.33     Insomnia, unspecified type     ICD-10-CM: G47.00  ICD-9-CM: 780.52     Vitamin D deficiency     ICD-10-CM: E55.9  ICD-9-CM: 268.9     Moderate persistent asthma with acute exacerbation     ICD-10-CM: J45.41  ICD-9-CM: 493.92     Seasonal allergic rhinitis, unspecified trigger     ICD-10-CM: J30.2  ICD-9-CM: 477.9     Need for immunization against influenza     ICD-10-CM: Z23  ICD-9-CM: V04.81         Patient instructed to check blood pressure daily, record, and bring to next visit. If the readings run 140/90 or greater, the patient is to  call my office or return sooner for evaluation. Pt advised to eat a heart healthy diet and get regular aerobic exercise.    Discussed need for weight management.  I recommend they use a weight loss jose david on their phone, eat no more than [id number] earline/day, or use intermittent fasting and exercise 30 to 60 minutes 3 times a week.  Discussed weight loss with diet, recommend Weight Watchers or Mediterranean Diet, but stated that whatever method works for this patient is best. The patient agrees with all recommendations at this time. I have discussed a weight loss plan to be determined by this patient.      Patient has been given a handout on allergic rhinitis and recommended to follow these guidelines and take recommended medications as directed.  We generally start with over-the-counter antihistamines and or nasal steroid spray.  Patient was encouraged to keep me informed of any acute changes, lack of improvement, or any new concerning symptoms.  If patient becomes concerned, or has any worsening symptoms, they are to report either here, urgent treatment, or emergency room. Plan of care discussed with pt. They verbalized understanding and agreement.     For insomnia we will have him try over-the-counter sleep aids like melatonin.    Return visit in return visit 3 months    Counseling provided on weight management, insomnia, hypertension, and allergic rhinitis.    Bandar Alva, APRN   10/23/2024   12:33 EDT     Please note that portions of this document were completed with a voice recognition program.     At [facility], we believe that sharing information builds trust and better relationships. You are receiving this note because you are receiving care at [facility] or have recently visited. It is possible you will see health information before a provider has talked with you about it. This kind of information can be easy to misunderstand as it is a medical document. It is intended as [id number]  communication. It is written in medical language and may contain abbreviations or verbiage that are unfamiliar. It may appear blunt or direct. Medical documents are intended to carry relevant information, facts as evident, and the clinical opinion of the practitioner.  To help you fully understand what it means for your health, we urge you to discuss this note with your provider.

## 2025-01-23 ENCOUNTER — OFFICE VISIT (OUTPATIENT)
Age: 20
End: 2025-01-23
Payer: COMMERCIAL

## 2025-01-23 VITALS
RESPIRATION RATE: 20 BRPM | SYSTOLIC BLOOD PRESSURE: 112 MMHG | DIASTOLIC BLOOD PRESSURE: 68 MMHG | OXYGEN SATURATION: 97 % | WEIGHT: 233 LBS | BODY MASS INDEX: 33.36 KG/M2 | HEART RATE: 87 BPM | HEIGHT: 70 IN

## 2025-01-23 DIAGNOSIS — E66.811 CLASS 1 OBESITY DUE TO EXCESS CALORIES WITH SERIOUS COMORBIDITY AND BODY MASS INDEX (BMI) OF 33.0 TO 33.9 IN ADULT: ICD-10-CM

## 2025-01-23 DIAGNOSIS — G47.30 SLEEP-DISORDERED BREATHING: ICD-10-CM

## 2025-01-23 DIAGNOSIS — J45.41 MODERATE PERSISTENT ASTHMA WITH ACUTE EXACERBATION: ICD-10-CM

## 2025-01-23 DIAGNOSIS — G43.E09 CHRONIC MIGRAINE WITH AURA WITHOUT STATUS MIGRAINOSUS, NOT INTRACTABLE: ICD-10-CM

## 2025-01-23 DIAGNOSIS — E66.09 CLASS 1 OBESITY DUE TO EXCESS CALORIES WITH SERIOUS COMORBIDITY AND BODY MASS INDEX (BMI) OF 33.0 TO 33.9 IN ADULT: ICD-10-CM

## 2025-01-23 DIAGNOSIS — R25.1 TREMOR: ICD-10-CM

## 2025-01-23 DIAGNOSIS — I10 PRIMARY HYPERTENSION: Primary | ICD-10-CM

## 2025-01-23 DIAGNOSIS — F51.02 ADJUSTMENT INSOMNIA: ICD-10-CM

## 2025-01-23 DIAGNOSIS — J30.2 SEASONAL ALLERGIC RHINITIS, UNSPECIFIED TRIGGER: ICD-10-CM

## 2025-01-23 DIAGNOSIS — F41.9 ANXIETY: ICD-10-CM

## 2025-01-23 PROCEDURE — 3074F SYST BP LT 130 MM HG: CPT | Performed by: NURSE PRACTITIONER

## 2025-01-23 PROCEDURE — 3078F DIAST BP <80 MM HG: CPT | Performed by: NURSE PRACTITIONER

## 2025-01-23 PROCEDURE — 1160F RVW MEDS BY RX/DR IN RCRD: CPT | Performed by: NURSE PRACTITIONER

## 2025-01-23 PROCEDURE — 1159F MED LIST DOCD IN RCRD: CPT | Performed by: NURSE PRACTITIONER

## 2025-01-23 PROCEDURE — 99214 OFFICE O/P EST MOD 30 MIN: CPT | Performed by: NURSE PRACTITIONER

## 2025-01-23 RX ORDER — ERENUMAB-AOOE 70 MG/ML
70 INJECTION SUBCUTANEOUS
Start: 2025-01-23 | End: 2026-01-23

## 2025-01-23 RX ORDER — DULOXETIN HYDROCHLORIDE 30 MG/1
30 CAPSULE, DELAYED RELEASE ORAL DAILY
Qty: 90 CAPSULE | Refills: 1 | Status: SHIPPED | OUTPATIENT
Start: 2025-01-23

## 2025-01-23 RX ORDER — MONTELUKAST SODIUM 10 MG/1
10 TABLET ORAL NIGHTLY
Qty: 90 TABLET | Refills: 1 | Status: SHIPPED | OUTPATIENT
Start: 2025-01-23

## 2025-01-23 RX ORDER — PROPRANOLOL HYDROCHLORIDE 40 MG/1
40 TABLET ORAL 2 TIMES DAILY
Qty: 60 TABLET | Refills: 5 | Status: SHIPPED | OUTPATIENT
Start: 2025-01-23

## 2025-01-23 NOTE — PATIENT INSTRUCTIONS
Obesity, Adult  Obesity is the condition of having too much total body fat. Being overweight or obese means that your weight is greater than what is considered healthy for your body size. Obesity is determined by a measurement called BMI (body mass index). BMI is an estimate of body fat and is calculated from height and weight. For adults, a BMI of 30 or higher is considered obese.  Obesity can lead to other health concerns and major illnesses, including:  Stroke.  Coronary artery disease (CAD).  Type 2 diabetes.  Some types of cancer, including cancers of the colon, breast, uterus, and gallbladder.  High blood pressure (hypertension).  High cholesterol.  Gallbladder stones.  Obesity can also contribute to:  Osteoarthritis.  Sleep apnea.  Infertility problems.  What are the causes?  Common causes of this condition include:  Eating daily meals that are high in calories, sugar, and fat.  Drinking high amounts of sugar-sweetened beverages, such as soft drinks.  Being born with genes that may make you more likely to become obese.  Having a medical condition that causes obesity, including:  Hypothyroidism.  Polycystic ovarian syndrome (PCOS).  Binge-eating disorder.  Cushing syndrome.  Taking certain medicines, such as steroids, antidepressants, and seizure medicines.  Not being physically active (sedentary lifestyle).  Not getting enough sleep.  What increases the risk?  The following factors may make you more likely to develop this condition:  Having a family history of obesity.  Living in an area with limited access to:  [place], recreation centers, or sidewalks.  Healthy food choices, such as grocery stores and [employer]' markets.  What are the signs or symptoms?  The main sign of this condition is having too much body fat.  How is this diagnosed?  This condition is diagnosed based on:  Your BMI. If you are an adult with a BMI of 30 or higher, you are considered obese.  Your waist circumference. This measures the  distance around your waistline.  Your skinfold thickness. Your health care provider may gently pinch a fold of your skin and measure it.  You may have other tests to check for underlying conditions.  How is this treated?  Treatment for this condition often includes changing your lifestyle. Treatment may include some or all of the following:  Dietary changes. This may include developing a healthy meal plan.  Regular physical activity. This may include activity that causes your heart to beat faster (aerobic exercise) and strength training. Work with your health care provider to design an exercise program that works for you.  Medicine to help you lose weight if you are unable to lose one pound a week after six weeks of healthy eating and more physical activity.  Treating conditions that cause the obesity (underlying conditions).  Surgery. Surgical options may include gastric banding and gastric bypass. Surgery may be done if:  Other treatments have not helped to improve your condition.  You have a BMI of 40 or higher.  You have life-threatening health problems related to obesity.  Follow these instructions at home:  Eating and drinking    Follow recommendations from your health care provider about what you eat and drink. Your health care provider may advise you to:  Limit fast food, sweets, and processed snack foods.  Choose low-fat options, such as low-fat milk instead of whole milk.  Eat five or more servings of fruits or vegetables every day.  Choose healthy foods when you eat out.  Keep low-fat snacks available.  Limit sugary drinks, such as soda, fruit juice, sweetened iced tea, and flavored milk.  Drink enough water to keep your urine pale yellow.  Do not follow a fad diet. Fad diets can be unhealthy and even dangerous.  Other healthful choices include:  Eat at home more often. This gives you more control over what you eat.  Learn to read food labels. This will help you understand how much food is considered one  serving.  Learn what a healthy serving size is.  Physical activity  Exercise regularly, as told by your health care provider.  Most adults should get up to 150 minutes of moderate-intensity exercise every week.  Ask your health care provider what types of exercise are safe for you and how often you should exercise.  Warm up and stretch before being active.  Cool down and stretch after being active.  Rest between periods of activity.  Lifestyle  Work with your health care provider and a dietitian to set a weight-loss goal that is healthy and reasonable for you.  Limit your screen time.  Find ways to reward yourself that do not involve food.  Do not drink alcohol if:  Your health care provider tells you not to drink.  You are pregnant, may be pregnant, or are planning to become pregnant.  If you drink alcohol:  Limit how much you have to:  0-1 drink a day for women.  0-2 drinks a day for men.  Know how much alcohol is in your drink. In the [place] one drink equals one 12 oz bottle of beer (355 mL), one 5 oz glass of wine (148 mL), or one 1½ oz glass of hard liquor (44 mL).  General instructions  Keep a weight-loss journal to keep track of the food you eat and how much exercise you get.  Take over-the-counter and prescription medicines only as told by your health care provider.  Take vitamins and supplements only as told by your health care provider.  Consider joining a support group. Your health care provider may be able to recommend a support group.  Pay attention to your mental health as obesity can lead to depression or self esteem issues.  Keep all follow-up visits. This is important.  Contact a health care provider if:  You are unable to meet your weight-loss goal after six weeks of dietary and lifestyle changes.  You have trouble breathing.  Summary  Obesity is the condition of having too much total body fat.  Being overweight or obese means that your weight is greater than what is considered healthy for your body  size.  Work with your health care provider and a dietitian to set a weight-loss goal that is healthy and reasonable for you.  Exercise regularly, as told by your health care provider. Ask your health care provider what types of exercise are safe for you and how often you should exercise.  This information is not intended to replace advice given to you by your health care provider. Make sure you discuss any questions you have with your health care provider.  Document Revised: 07/26/2022 Document Reviewed: 07/26/2022  [employer] Patient Education © 2024 [employer]

## 2025-01-23 NOTE — PROGRESS NOTES
Chief Complaint   Patient presents with    Hypertension    Insomnia    Obesity    Vitamin D Deficiency       Subjective   Polo Mcguire is a 19 y.o. male    Hypertension  Associated symptoms include headaches. Pertinent negatives include no chest pain, palpitations or shortness of breath.   Insomnia  Symptoms include fatigue and headaches.    Pertinent negative symptoms include no chest pain, no chills, no cough, no fever, no nausea, no rash, no dysuria, no vomiting and no weakness.   Obesity  Symptoms include fatigue and headaches.    Pertinent negative symptoms include no chest pain, no chills, no cough, no fever, no nausea, no rash, no dysuria, no vomiting and no weakness.     Patient today to follow-up on high blood pressure, insomnia, migraine headaches, weight and vitamin D deficiency.  Last visit we did recommend melatonin for sleep but he does says he does sleep sometimes.  For migraines we did prescribe Aimovig monthly and he is to get this today.  His weight is down 2 pounds from the last visit. He feels distracted and has not been taking Melatonin for insomnia. When falls asleep, he will stay asleep. Still with some depression, and anxiety. He does occasionally take duloxetine 30 mg. Helps when taking. He notes being very forgetful recently.  He does also note moving a lot while he sleeps as well as snoring loudly.  He noticed that he frequently breathes with his mouth open at night while sleeping.    No Known Allergies  Past Medical History:   Diagnosis Date    Acid reflux     Allergic     Asthma     Migraines       Past Surgical History:   Procedure Laterality Date    HERNIA REPAIR       Social History     Socioeconomic History    Marital status: Single   Tobacco Use    Smoking status: Never    Smokeless tobacco: Never   Vaping Use    Vaping status: Never Used   Substance and Sexual Activity    Alcohol use: Never    Drug use: Never    Sexual activity: Never        Current Outpatient Medications:      Aimovig 70 MG/ML auto-injector, Inject 1 mL under the skin into the appropriate area as directed Every 30 (Thirty) Days., Disp: , Rfl:     DULoxetine (CYMBALTA) 30 MG capsule, Take 1 capsule by mouth Daily., Disp: 90 capsule, Rfl: 1    fluticasone (FLONASE) 50 MCG/ACT nasal spray, Administer 2 sprays into the nostril(s) as directed by provider Daily., Disp: 18.2 mL, Rfl: 11    Fluticasone-Umeclidin-Vilant (Trelegy Ellipta) 100-62.5-25 MCG/ACT inhaler, Inhale 1 puff Daily., Disp: 60 each, Rfl: 5    guaiFENesin (Mucinex) 600 MG 12 hr tablet, Take 2 tablets by mouth 2 (Two) Times a Day., Disp: 60 tablet, Rfl: 2    loratadine (CLARITIN) 10 MG tablet, Take 1 tablet by mouth Daily., Disp: 90 tablet, Rfl: 3    montelukast (Singulair) 10 MG tablet, Take 1 tablet by mouth Every Night., Disp: 90 tablet, Rfl: 1    propranolol (INDERAL) 40 MG tablet, Take 1 tablet by mouth 2 (Two) Times a Day., Disp: 60 tablet, Rfl: 5    SUMAtriptan (IMITREX) 100 MG tablet, Take 1 tablet by mouth., Disp: , Rfl:      Review of Systems   Constitutional:  Positive for fatigue. Negative for chills, fever and unexpected weight change.   Respiratory:  Negative for cough, chest tightness, shortness of breath and wheezing.    Cardiovascular:  Negative for chest pain, palpitations and leg swelling.   Gastrointestinal:  Negative for constipation, diarrhea, nausea and vomiting.   Genitourinary:  Negative for difficulty urinating and dysuria.   Skin:  Negative for color change and rash.   Neurological:  Positive for headaches. Negative for dizziness, syncope and weakness.   Psychiatric/Behavioral:  Positive for dysphoric mood and sleep disturbance. The patient is nervous/anxious and has insomnia.        Objective     Vitals:    01/23/25 1123   BP: 112/68   Pulse: 87   Resp: 20   SpO2: 97%         01/23/25  1123   Weight: 106 kg (233 lb)     Body mass index is 33.21 kg/m².  Results for orders placed or performed in visit on 07/23/24   Comprehensive  Metabolic Panel    Collection Time: 07/23/24 10:29 AM    Specimen: Arm, Left; Blood   Result Value Ref Range    Glucose 83 65 - 99 mg/dL    BUN 11 6 - 20 mg/dL    Creatinine 0.90 0.76 - 1.27 mg/dL    Sodium 138 136 - 145 mmol/L    Potassium 4.6 3.5 - 5.2 mmol/L    Chloride 102 98 - 107 mmol/L    CO2 25.4 22.0 - 29.0 mmol/L    Calcium 9.3 8.6 - 10.5 mg/dL    Total Protein 7.2 6.0 - 8.5 g/dL    Albumin 4.4 3.5 - 5.2 g/dL    ALT (SGPT) 21 1 - 41 U/L    AST (SGOT) 14 1 - 40 U/L    Alkaline Phosphatase 83 39 - 117 U/L    Total Bilirubin 0.6 0.0 - 1.2 mg/dL    Globulin 2.8 gm/dL    A/G Ratio 1.6 g/dL    BUN/Creatinine Ratio 12.2 7.0 - 25.0    Anion Gap 10.6 5.0 - 15.0 mmol/L    eGFR 125.4 >60.0 mL/min/1.73   Lipid Panel    Collection Time: 07/23/24 10:29 AM    Specimen: Arm, Left; Blood   Result Value Ref Range    Total Cholesterol 150 0 - 200 mg/dL    Triglycerides 75 0 - 150 mg/dL    HDL Cholesterol 47 40 - 60 mg/dL    LDL Cholesterol  88 0 - 100 mg/dL    VLDL Cholesterol 15 5 - 40 mg/dL    LDL/HDL Ratio 1.87    Vitamin D,25-Hydroxy    Collection Time: 07/23/24 10:29 AM    Specimen: Arm, Left; Blood   Result Value Ref Range    25 Hydroxy, Vitamin D 21.0 (L) 30.0 - 100.0 ng/ml   TSH Rfx On Abnormal To Free T4    Collection Time: 07/23/24 10:29 AM    Specimen: Arm, Left; Blood   Result Value Ref Range    TSH 3.960 0.270 - 4.200 uIU/mL   CBC (No Diff)    Collection Time: 07/23/24 10:29 AM    Specimen: Arm, Left; Blood   Result Value Ref Range    WBC 11.17 (H) 3.40 - 10.80 10*3/mm3    RBC 5.77 4.14 - 5.80 10*6/mm3    Hemoglobin 16.8 13.0 - 17.7 g/dL    Hematocrit 51.7 (H) 37.5 - 51.0 %    MCV 89.6 79.0 - 97.0 fL    MCH 29.1 26.6 - 33.0 pg    MCHC 32.5 31.5 - 35.7 g/dL    RDW 12.9 12.3 - 15.4 %    RDW-SD 42.1 37.0 - 54.0 fl    MPV 9.9 6.0 - 12.0 fL    Platelets 270 140 - 450 10*3/mm3   Hepatitis C Antibody    Collection Time: 07/23/24 10:29 AM    Specimen: Arm, Left; Blood   Result Value Ref Range    Hepatitis C Ab  Non-Reactive Non-Reactive       Physical Exam  Vitals reviewed.   Constitutional:       Appearance: He is well-developed.   HENT:      Head: Normocephalic and atraumatic.   Cardiovascular:      Rate and Rhythm: Normal rate and regular rhythm.      Heart sounds: Normal heart sounds.   Pulmonary:      Effort: Pulmonary effort is normal.      Breath sounds: Normal breath sounds.   Skin:     General: Skin is warm and dry.   Neurological:      Mental Status: He is alert and oriented to person, place, and time.   Psychiatric:         Behavior: Behavior normal.         Assessment & Plan   Problems Addressed this Visit          Cardiac and Vasculature    Primary hypertension - Primary    Relevant Medications    propranolol (INDERAL) 40 MG tablet       Endocrine and Metabolic    Class 1 obesity due to excess calories with serious comorbidity and body mass index (BMI) of 33.0 to 33.9 in adult       Mental Health    Anxiety    Relevant Medications    DULoxetine (CYMBALTA) 30 MG capsule    Other Relevant Orders    Ambulatory Referral to Psychiatry       Neuro    Migraine    Relevant Medications    DULoxetine (CYMBALTA) 30 MG capsule    Aimovig 70 MG/ML auto-injector    propranolol (INDERAL) 40 MG tablet       Sleep    Insomnia    Relevant Orders    Ambulatory Referral to Psychiatry     Other Visit Diagnoses       Tremor        Relevant Medications    propranolol (INDERAL) 40 MG tablet    Moderate persistent asthma with acute exacerbation        Relevant Medications    montelukast (Singulair) 10 MG tablet    Seasonal allergic rhinitis, unspecified trigger        Relevant Medications    montelukast (Singulair) 10 MG tablet    Sleep-disordered breathing        Relevant Orders    Ambulatory Referral to Sleep Medicine          Diagnoses         Codes Comments    Primary hypertension    -  Primary ICD-10-CM: I10  ICD-9-CM: 401.9     Class 1 obesity due to excess calories with serious comorbidity and body mass index (BMI) of 33.0 to  33.9 in adult     ICD-10-CM: E66.811, E66.09, Z68.33  ICD-9-CM: 278.00, V85.33     Adjustment insomnia     ICD-10-CM: F51.02  ICD-9-CM: 307.41     Anxiety     ICD-10-CM: F41.9  ICD-9-CM: 300.00     Chronic migraine with aura without status migrainosus, not intractable     ICD-10-CM: G43.E09  ICD-9-CM: 346.00     Tremor     ICD-10-CM: R25.1  ICD-9-CM: 781.0     Moderate persistent asthma with acute exacerbation     ICD-10-CM: J45.41  ICD-9-CM: 493.92     Seasonal allergic rhinitis, unspecified trigger     ICD-10-CM: J30.2  ICD-9-CM: 477.9     Sleep-disordered breathing     ICD-10-CM: G47.30  ICD-9-CM: 780.59         Patient instructed to check blood pressure daily, record, and bring to next visit. If the readings run 140/90 or greater, the patient is to call my office or return sooner for evaluation. Pt advised to eat a heart healthy diet and get regular aerobic exercise.    Discussed need for weight management.  I recommend they use a weight loss jose david on their phone, eat no more than [id number] earline/day, or use intermittent fasting and exercise 30 to 60 minutes 3 times a week.  Discussed weight loss with diet, recommend Weight Watchers or Mediterranean Diet, but stated that whatever method works for this patient is best. The patient agrees with all recommendations at this time. I have discussed a weight loss plan to be determined by this patient.     For his sleep breathing issues I will refer him for a sleep study.  I have recommended he take his allergy medicine as directed.    For his worsening anxiety I will refer him to psychiatry for medications and or counseling.    Time spent on visit, including counseling, education, reviewing the chart, and any recent test results, was     30   Minutes    Return visit in 3 months    Counseling provided on weight management and mental health concerns.    Bandardelio Alva, APRN   1/23/2025   12:31 EST     Please note that portions of this document were completed with a voice  recognition program.     At [facility], we believe that sharing information builds trust and better relationships. You are receiving this note because you are receiving care at [facility] or have recently visited. It is possible you will see health information before a provider has talked with you about it. This kind of information can be easy to misunderstand as it is a medical document. It is intended as wrey-gg-tpiy communication. It is written in medical language and may contain abbreviations or verbiage that are unfamiliar. It may appear blunt or direct. Medical documents are intended to carry relevant information, facts as evident, and the clinical opinion of the practitioner.  To help you fully understand what it means for your health, we urge you to discuss this note with your provider.

## 2025-03-31 ENCOUNTER — OFFICE VISIT (OUTPATIENT)
Age: 20
End: 2025-03-31
Payer: COMMERCIAL

## 2025-03-31 VITALS
BODY MASS INDEX: 33.79 KG/M2 | HEIGHT: 70 IN | SYSTOLIC BLOOD PRESSURE: 112 MMHG | HEART RATE: 74 BPM | WEIGHT: 236 LBS | DIASTOLIC BLOOD PRESSURE: 72 MMHG | OXYGEN SATURATION: 98 %

## 2025-03-31 DIAGNOSIS — F41.1 GENERALIZED ANXIETY DISORDER: Primary | ICD-10-CM

## 2025-03-31 DIAGNOSIS — F33.0 MILD EPISODE OF RECURRENT MAJOR DEPRESSIVE DISORDER: ICD-10-CM

## 2025-03-31 DIAGNOSIS — Z91.51 HISTORY OF SUICIDE ATTEMPT: ICD-10-CM

## 2025-03-31 DIAGNOSIS — F43.10 PTSD (POST-TRAUMATIC STRESS DISORDER): ICD-10-CM

## 2025-03-31 DIAGNOSIS — Z51.81 THERAPEUTIC DRUG MONITORING: ICD-10-CM

## 2025-03-31 PROCEDURE — 1160F RVW MEDS BY RX/DR IN RCRD: CPT

## 2025-03-31 PROCEDURE — 1159F MED LIST DOCD IN RCRD: CPT

## 2025-03-31 PROCEDURE — 3078F DIAST BP <80 MM HG: CPT

## 2025-03-31 PROCEDURE — 90792 PSYCH DIAG EVAL W/MED SRVCS: CPT

## 2025-03-31 PROCEDURE — 3074F SYST BP LT 130 MM HG: CPT

## 2025-04-03 LAB
1OH-MIDAZOLAM UR QL SCN: NOT DETECTED NG/MG CREAT
6MAM UR QL SCN: NEGATIVE NG/ML
7AMINOCLONAZEPAM/CREAT UR: NOT DETECTED NG/MG CREAT
A-OH ALPRAZ/CREAT UR: NOT DETECTED NG/MG CREAT
A-OH-TRIAZOLAM/CREAT UR CFM: NOT DETECTED NG/MG CREAT
ACP UR QL CFM: NOT DETECTED
ALPRAZ/CREAT UR CFM: NOT DETECTED NG/MG CREAT
AMPHETAMINES UR QL SCN: NEGATIVE NG/ML
APAP UR QL SCN: NEGATIVE UG/ML
BARBITURATES UR QL SCN: NEGATIVE NG/ML
BENZODIAZ SCN METH UR: NEGATIVE
BUPRENORPHINE UR QL SCN: NEGATIVE
BUPRENORPHINE/CREAT UR: NOT DETECTED NG/MG CREAT
CANNABINOIDS UR QL SCN: NEGATIVE NG/ML
CARISOPRODOL UR QL: NEGATIVE NG/ML
CLONAZEPAM/CREAT UR CFM: NOT DETECTED NG/MG CREAT
COCAINE+BZE UR QL SCN: NEGATIVE NG/ML
CREAT UR-MCNC: 197 MG/DL
D-METHORPHAN UR-MCNC: NOT DETECTED NG/ML
D-METHORPHAN+LEVORPHANOL UR QL: NOT DETECTED
DESALKYLFLURAZ/CREAT UR: NOT DETECTED NG/MG CREAT
DIAZEPAM/CREAT UR: NOT DETECTED NG/MG CREAT
ETHANOL UR QL SCN: NEGATIVE G/DL
ETHANOL UR QL SCN: NEGATIVE NG/ML
FENTANYL CTO UR SCN-MCNC: NEGATIVE NG/ML
FENTANYL/CREAT UR: NOT DETECTED NG/MG CREAT
FLUNITRAZEPAM UR QL SCN: NOT DETECTED NG/MG CREAT
GABAPENTIN UR-MCNC: NEGATIVE UG/ML
HALLUCINOGENS UR: NEGATIVE
HYPNOTICS UR QL SCN: NEGATIVE
KETAMINE UR QL: NOT DETECTED
LORAZEPAM/CREAT UR: NOT DETECTED NG/MG CREAT
MEPERIDINE UR QL SCN: NEGATIVE NG/ML
METHADONE UR QL SCN: NEGATIVE NG/ML
METHADONE+METAB UR QL SCN: NEGATIVE NG/ML
MIDAZOLAM/CREAT UR CFM: NOT DETECTED NG/MG CREAT
MISCELLANEOUS, UR: NEGATIVE
NORBUPRENORPHINE/CREAT UR: NOT DETECTED NG/MG CREAT
NORDIAZEPAM/CREAT UR: NOT DETECTED NG/MG CREAT
NORFENTANYL/CREAT UR: NOT DETECTED NG/MG CREAT
NORFLUNITRAZEPAM UR-MCNC: NOT DETECTED NG/MG CREAT
NORKETAMINE UR-MCNC: NOT DETECTED UG/ML
OPIATES UR SCN-MCNC: NEGATIVE NG/ML
OXAZEPAM/CREAT UR: NOT DETECTED NG/MG CREAT
OXYCODONE CTO UR SCN-MCNC: NEGATIVE NG/ML
PCP UR QL SCN: NEGATIVE NG/ML
PRESCRIBED MEDICATIONS: NORMAL
PROPOXYPH UR QL SCN: NEGATIVE NG/ML
TAPENTADOL CTO UR SCN-MCNC: NEGATIVE NG/ML
TEMAZEPAM/CREAT UR: NOT DETECTED NG/MG CREAT
TRAMADOL UR QL SCN: NEGATIVE NG/ML
ZALEPLON UR-MCNC: NOT DETECTED NG/ML
ZOLPIDEM PHENYL-4-CARB UR QL SCN: NOT DETECTED
ZOLPIDEM UR QL SCN: NOT DETECTED
ZOPICLONE-N-OXIDE UR-MCNC: NOT DETECTED NG/ML

## 2025-04-17 ENCOUNTER — OFFICE VISIT (OUTPATIENT)
Dept: SLEEP MEDICINE | Facility: CLINIC | Age: 20
End: 2025-04-17
Payer: COMMERCIAL

## 2025-04-17 VITALS
SYSTOLIC BLOOD PRESSURE: 126 MMHG | BODY MASS INDEX: 33.36 KG/M2 | DIASTOLIC BLOOD PRESSURE: 74 MMHG | HEART RATE: 87 BPM | OXYGEN SATURATION: 98 % | TEMPERATURE: 98.3 F | HEIGHT: 70 IN | WEIGHT: 233 LBS

## 2025-04-17 DIAGNOSIS — E66.811 CLASS 1 OBESITY DUE TO EXCESS CALORIES WITH SERIOUS COMORBIDITY AND BODY MASS INDEX (BMI) OF 33.0 TO 33.9 IN ADULT: ICD-10-CM

## 2025-04-17 DIAGNOSIS — I10 PRIMARY HYPERTENSION: Primary | ICD-10-CM

## 2025-04-17 DIAGNOSIS — R51.9 MORNING HEADACHE: ICD-10-CM

## 2025-04-17 DIAGNOSIS — F51.02 ADJUSTMENT INSOMNIA: ICD-10-CM

## 2025-04-17 DIAGNOSIS — R06.83 SNORING: ICD-10-CM

## 2025-04-17 DIAGNOSIS — G47.19 EXCESSIVE DAYTIME SLEEPINESS: ICD-10-CM

## 2025-04-17 DIAGNOSIS — E66.09 CLASS 1 OBESITY DUE TO EXCESS CALORIES WITH SERIOUS COMORBIDITY AND BODY MASS INDEX (BMI) OF 33.0 TO 33.9 IN ADULT: ICD-10-CM

## 2025-04-17 PROCEDURE — 3074F SYST BP LT 130 MM HG: CPT | Performed by: NURSE PRACTITIONER

## 2025-04-17 PROCEDURE — 99213 OFFICE O/P EST LOW 20 MIN: CPT | Performed by: NURSE PRACTITIONER

## 2025-04-17 PROCEDURE — 3078F DIAST BP <80 MM HG: CPT | Performed by: NURSE PRACTITIONER

## 2025-04-17 RX ORDER — PREDNISONE 20 MG/1
20 TABLET ORAL DAILY
COMMUNITY

## 2025-04-17 NOTE — PROGRESS NOTES
"Chief Complaint:   Chief Complaint   Patient presents with    Sleeping Problem       HPI:    Polo Mcguire is a 20 y.o. male here to establish care.  Patient sees MARC Hansen for care.  Patient has medical history as below:  Past Medical History:   Diagnosis Date    Acid reflux     Allergic     Asthma     Migraines    Patient has 3 to 4-year history of snoring, excessive daytime sleepiness, frequent awakenings, daily morning headaches, dry mouth, leg and body jerks, leg cramping, difficulty falling and staying asleep, sleepwalking/talking, nightmares, and acting out dreams.  Patient gets frustrated during the night and states \"it feels like my mind cannot shut down so I am up all night.\"  He has no history of nasal fracture but has had a concussion in the past.  Patient is here today to see if sleep apnea is somehow playing a role in current symptoms or any past or present diagnoses.    Patient keeps the same sleep schedule weekend and weekday going to bed at 2 AM getting up at 11 AM.  He estimates getting 5 hours of sleep nightly.  He can take him up to 4 hours to go to sleep and he does toss and turn throughout the night.  Patient has an [place] score of 13/24.    Sleep apnea risk factors:  Patient has symptoms Snoring, Restless sleep, Daytime sleepiness, Frequent arousal from sleep, Apnea, and Morning headaches  suggestive of the presence of obstructive sleep apnea. The patient has the following co-morbid conditions of HTN, depression, and asthma and obese (BMI >30). We did speak today about the consequences of untreated sleep apnea worsening the previously listed co-morbidities and sudden cardiac death.  We also discussed different therapies available to him such as CPAP, MAD, or ENT referral.  Patient verbalizes understanding.      Social history  This very pleasant 20-year-old male.  Patient is a non-smoker and nondrinker.  He has 1 regular tea daily.    Family History   Problem Relation Age of Onset    " Cancer Mother         thyroid and cervical    Obesity Mother     Depression Neg Hx      Past Surgical History:   Procedure Laterality Date    HERNIA REPAIR             Current medications are:   Current Outpatient Medications:     Aimovig 70 MG/ML auto-injector, Inject 1 mL under the skin into the appropriate area as directed Every 30 (Thirty) Days., Disp: , Rfl:     DULoxetine (CYMBALTA) 30 MG capsule, Take 1 capsule by mouth Daily., Disp: 90 capsule, Rfl: 1    fluticasone (FLONASE) 50 MCG/ACT nasal spray, Administer 2 sprays into the nostril(s) as directed by provider Daily., Disp: 18.2 mL, Rfl: 11    Fluticasone-Umeclidin-Vilant (Trelegy Ellipta) 100-62.5-25 MCG/ACT inhaler, Inhale 1 puff Daily., Disp: 60 each, Rfl: 5    galcanezumab-gnlm (EMGALITY) 120 MG/ML auto-injector pen, Inject 1 mL under the skin into the appropriate area as directed Every 30 (Thirty) Days., Disp: , Rfl:     guaiFENesin (Mucinex) 600 MG 12 hr tablet, Take 2 tablets by mouth 2 (Two) Times a Day., Disp: 60 tablet, Rfl: 2    loratadine (CLARITIN) 10 MG tablet, Take 1 tablet by mouth Daily., Disp: 90 tablet, Rfl: 3    montelukast (Singulair) 10 MG tablet, Take 1 tablet by mouth Every Night., Disp: 90 tablet, Rfl: 1    predniSONE (DELTASONE) 20 MG tablet, Take 1 tablet by mouth Daily., Disp: , Rfl:     propranolol (INDERAL) 40 MG tablet, Take 1 tablet by mouth 2 (Two) Times a Day., Disp: 60 tablet, Rfl: 5    SUMAtriptan (IMITREX) 100 MG tablet, Take 1 tablet by mouth., Disp: , Rfl: .      The patient's relevant past medical, surgical, family and social history were reviewed and updated in Epic as appropriate.       Review of Systems   Constitutional:  Positive for fatigue.   HENT:  Positive for drooling, nosebleeds, rhinorrhea, sinus pressure, sneezing and sore throat.    Eyes:  Positive for visual disturbance.   Respiratory:  Positive for wheezing.    Allergic/Immunologic: Positive for environmental allergies.   Neurological:  Positive for  "dizziness, tremors and headaches.   Psychiatric/Behavioral:  Positive for agitation, dysphoric mood and sleep disturbance. The patient is nervous/anxious.    All other systems reviewed and are negative.        Objective:  /74   Pulse 87   Temp 98.3 °F (36.8 °C) (Infrared)   Ht 178.4 cm (70.24\")   Wt 106 kg (233 lb)   SpO2 98%   BMI 33.21 kg/m²     Physical Exam  Constitutional:       Appearance: Normal appearance.   HENT:      Head: Normocephalic and atraumatic.      Mouth/Throat:      Mouth: Mucous membranes are moist.      Pharynx: Oropharynx is clear.      Comments: Class 4 airway  Cardiovascular:      Rate and Rhythm: Normal rate and regular rhythm.   Pulmonary:      Effort: Pulmonary effort is normal.      Breath sounds: Normal breath sounds.   Skin:     General: Skin is warm and dry.   Neurological:      Mental Status: He is alert and oriented to person, place, and time.   Psychiatric:         Mood and Affect: Mood normal.         Behavior: Behavior normal.         Thought Content: Thought content normal.         Judgment: Judgment normal.             ASSESSMENT/PLAN    Diagnoses and all orders for this visit:    1. Primary hypertension (Primary)  -     Home Sleep Study; Future    2. Class 1 obesity due to excess calories with serious comorbidity and body mass index (BMI) of 33.0 to 33.9 in adult  -     Home Sleep Study; Future    3. Adjustment insomnia  -     Home Sleep Study; Future    4. Excessive daytime sleepiness  -     Home Sleep Study; Future    5. Morning headache  -     Home Sleep Study; Future    6. Snoring  -     Home Sleep Study; Future        Counseled patient regarding multimodal approach with healthy nutrition, healthy sleep, regular physical activity, social activities, counseling, and medications. Encouraged to practice lateral sleep position. Avoid alcohol and sedatives close to bedtime.    Patient certainly has a strong story for sleep apnea and due to the consequences of " untreated sleep apnea we will move forward with HST and follow-up as appropriate.    Thank you for this kind referral.      Signed by  MARC Mart    April 17, 2025      CC: Bandar Alva, Bandar Casper, AP*

## 2025-04-25 ENCOUNTER — OFFICE VISIT (OUTPATIENT)
Age: 20
End: 2025-04-25
Payer: COMMERCIAL

## 2025-04-25 VITALS
SYSTOLIC BLOOD PRESSURE: 108 MMHG | HEIGHT: 70 IN | OXYGEN SATURATION: 97 % | HEART RATE: 82 BPM | WEIGHT: 233 LBS | BODY MASS INDEX: 33.36 KG/M2 | DIASTOLIC BLOOD PRESSURE: 82 MMHG

## 2025-04-25 DIAGNOSIS — R25.1 TREMOR: ICD-10-CM

## 2025-04-25 DIAGNOSIS — E55.9 VITAMIN D DEFICIENCY: ICD-10-CM

## 2025-04-25 DIAGNOSIS — F41.9 ANXIETY: ICD-10-CM

## 2025-04-25 DIAGNOSIS — Z23 NEED FOR TETANUS BOOSTER: ICD-10-CM

## 2025-04-25 DIAGNOSIS — Z23 MENINGOCOCCAL GROUP B VACCINE ADMINISTERED: ICD-10-CM

## 2025-04-25 DIAGNOSIS — J30.2 SEASONAL ALLERGIC RHINITIS, UNSPECIFIED TRIGGER: ICD-10-CM

## 2025-04-25 DIAGNOSIS — Z13.0 SCREENING FOR DEFICIENCY ANEMIA: ICD-10-CM

## 2025-04-25 DIAGNOSIS — Z13.220 SCREENING FOR LIPID DISORDERS: ICD-10-CM

## 2025-04-25 DIAGNOSIS — I10 PRIMARY HYPERTENSION: ICD-10-CM

## 2025-04-25 DIAGNOSIS — Z00.00 WELLNESS EXAMINATION: Primary | ICD-10-CM

## 2025-04-25 DIAGNOSIS — Z13.29 SCREENING FOR THYROID DISORDER: ICD-10-CM

## 2025-04-25 DIAGNOSIS — Z13.1 SCREENING FOR DIABETES MELLITUS: ICD-10-CM

## 2025-04-25 DIAGNOSIS — J45.41 MODERATE PERSISTENT ASTHMA WITH ACUTE EXACERBATION: ICD-10-CM

## 2025-04-25 DIAGNOSIS — E66.811 CLASS 1 OBESITY DUE TO EXCESS CALORIES WITH SERIOUS COMORBIDITY AND BODY MASS INDEX (BMI) OF 33.0 TO 33.9 IN ADULT: ICD-10-CM

## 2025-04-25 DIAGNOSIS — E66.09 CLASS 1 OBESITY DUE TO EXCESS CALORIES WITH SERIOUS COMORBIDITY AND BODY MASS INDEX (BMI) OF 33.0 TO 33.9 IN ADULT: ICD-10-CM

## 2025-04-25 DIAGNOSIS — G43.E09 CHRONIC MIGRAINE WITH AURA WITHOUT STATUS MIGRAINOSUS, NOT INTRACTABLE: ICD-10-CM

## 2025-04-25 RX ORDER — MONTELUKAST SODIUM 10 MG/1
10 TABLET ORAL NIGHTLY
Qty: 90 TABLET | Refills: 3 | Status: SHIPPED | OUTPATIENT
Start: 2025-04-25

## 2025-04-25 RX ORDER — BUDESONIDE, GLYCOPYRROLATE, AND FORMOTEROL FUMARATE 160; 9; 4.8 UG/1; UG/1; UG/1
2 AEROSOL, METERED RESPIRATORY (INHALATION) 2 TIMES DAILY
Qty: 10.7 G | Refills: 5 | Status: SHIPPED | OUTPATIENT
Start: 2025-04-25

## 2025-04-25 RX ORDER — PROPRANOLOL HYDROCHLORIDE 40 MG/1
40 TABLET ORAL 2 TIMES DAILY
Qty: 180 TABLET | Refills: 3 | Status: SHIPPED | OUTPATIENT
Start: 2025-04-25

## 2025-04-25 RX ORDER — LORATADINE 10 MG/1
10 TABLET ORAL DAILY
Qty: 90 TABLET | Refills: 3 | Status: SHIPPED | OUTPATIENT
Start: 2025-04-25

## 2025-04-25 RX ORDER — FLUTICASONE PROPIONATE 50 MCG
2 SPRAY, SUSPENSION (ML) NASAL DAILY
Qty: 18.2 G | Refills: 11 | Status: SHIPPED | OUTPATIENT
Start: 2025-04-25

## 2025-04-25 RX ORDER — DULOXETIN HYDROCHLORIDE 60 MG/1
60 CAPSULE, DELAYED RELEASE ORAL DAILY
Qty: 90 CAPSULE | Refills: 1 | Status: SHIPPED | OUTPATIENT
Start: 2025-04-25

## 2025-04-25 NOTE — PATIENT INSTRUCTIONS
Obesity, Adult  Obesity is the condition of having too much total body fat. Being overweight or obese means that your weight is greater than what is considered healthy for your body size. Obesity is determined by a measurement called BMI (body mass index). BMI is an estimate of body fat and is calculated from height and weight. For adults, a BMI of 30 or higher is considered obese.  Obesity can lead to other health concerns and major illnesses, including:  Stroke.  Coronary artery disease (CAD).  Type 2 diabetes.  Some types of cancer, including cancers of the colon, breast, uterus, and gallbladder.  High blood pressure (hypertension).  High cholesterol.  Gallbladder stones.  Obesity can also contribute to:  Osteoarthritis.  Sleep apnea.  Infertility problems.  What are the causes?  Common causes of this condition include:  Eating daily meals that are high in calories, sugar, and fat.  Drinking high amounts of sugar-sweetened beverages, such as soft drinks.  Being born with genes that may make you more likely to become obese.  Having a medical condition that causes obesity, including:  Hypothyroidism.  Polycystic ovarian syndrome (PCOS).  Binge-eating disorder.  Cushing syndrome.  Taking certain medicines, such as steroids, antidepressants, and seizure medicines.  Not being physically active (sedentary lifestyle).  Not getting enough sleep.  What increases the risk?  The following factors may make you more likely to develop this condition:  Having a family history of obesity.  Living in an area with limited access to:  [place], recreation centers, or sidewalks.  Healthy food choices, such as grocery stores and [employer] markets.  What are the signs or symptoms?  The main sign of this condition is having too much body fat.  How is this diagnosed?  This condition is diagnosed based on:  Your BMI. If you are an adult with a BMI of 30 or higher, you are considered obese.  Your waist circumference. This measures the  distance around your waistline.  Your skinfold thickness. Your health care provider may gently pinch a fold of your skin and measure it.  You may have other tests to check for underlying conditions.  How is this treated?  Treatment for this condition often includes changing your lifestyle. Treatment may include some or all of the following:  Dietary changes. This may include developing a healthy meal plan.  Regular physical activity. This may include activity that causes your heart to beat faster (aerobic exercise) and strength training. Work with your health care provider to design an exercise program that works for you.  Medicine to help you lose weight if you are unable to lose one pound a week after six weeks of healthy eating and more physical activity.  Treating conditions that cause the obesity (underlying conditions).  Surgery. Surgical options may include gastric banding and gastric bypass. Surgery may be done if:  Other treatments have not helped to improve your condition.  You have a BMI of 40 or higher.  You have life-threatening health problems related to obesity.  Follow these instructions at home:  Eating and drinking    Follow recommendations from your health care provider about what you eat and drink. Your health care provider may advise you to:  Limit fast food, sweets, and processed snack foods.  Choose low-fat options, such as low-fat milk instead of whole milk.  Eat five or more servings of fruits or vegetables every day.  Choose healthy foods when you eat out.  Keep low-fat snacks available.  Limit sugary drinks, such as soda, fruit juice, sweetened iced tea, and flavored milk.  Drink enough water to keep your urine pale yellow.  Do not follow a fad diet. Fad diets can be unhealthy and even dangerous.  Other healthful choices include:  Eat at home more often. This gives you more control over what you eat.  Learn to read food labels. This will help you understand how much food is considered one  serving.  Learn what a healthy serving size is.  Physical activity  Exercise regularly, as told by your health care provider.  Most adults should get up to 150 minutes of moderate-intensity exercise every week.  Ask your health care provider what types of exercise are safe for you and how often you should exercise.  Warm up and stretch before being active.  Cool down and stretch after being active.  Rest between periods of activity.  Lifestyle  Work with your health care provider and a dietitian to set a weight-loss goal that is healthy and reasonable for you.  Limit your screen time.  Find ways to reward yourself that do not involve food.  Do not drink alcohol if:  Your health care provider tells you not to drink.  You are pregnant, may be pregnant, or are planning to become pregnant.  If you drink alcohol:  Limit how much you have to:  0-1 drink a day for women.  0-2 drinks a day for men.  Know how much alcohol is in your drink. In the [place] one drink equals one 12 oz bottle of beer (355 mL), one 5 oz glass of wine (148 mL), or one 1½ oz glass of hard liquor (44 mL).  General instructions  Keep a weight-loss journal to keep track of the food you eat and how much exercise you get.  Take over-the-counter and prescription medicines only as told by your health care provider.  Take vitamins and supplements only as told by your health care provider.  Consider joining a support group. Your health care provider may be able to recommend a support group.  Pay attention to your mental health as obesity can lead to depression or self esteem issues.  Keep all follow-up visits. This is important.  Contact a health care provider if:  You are unable to meet your weight-loss goal after six weeks of dietary and lifestyle changes.  You have trouble breathing.  Summary  Obesity is the condition of having too much total body fat.  Being overweight or obese means that your weight is greater than what is considered healthy for your body  size.  Work with your health care provider and a dietitian to set a weight-loss goal that is healthy and reasonable for you.  Exercise regularly, as told by your health care provider. Ask your health care provider what types of exercise are safe for you and how often you should exercise.  This information is not intended to replace advice given to you by your health care provider. Make sure you discuss any questions you have with your health care provider.  Document Revised: 07/26/2022 Document Reviewed: 07/26/2022  [employer]vier Patient Education © 2024 [employer]

## 2025-04-25 NOTE — LETTER
[facility]  Vaccine Consent Form    Patient Name:  Polo Mcguire  Patient :  2005     Vaccine(s) Ordered    Tdap Vaccine Greater Than or Equal To 8yo IM        Screening Checklist  The following questions should be completed prior to vaccination. If you answer “yes” to any question, it does not necessarily mean you should not be vaccinated. It just means we may need to clarify or ask more questions. If a question is unclear, please ask your healthcare provider to explain it.    Yes No   Any fever or moderate to severe illness today (mild illness and/or antibiotic treatment are not contraindications)?     Do you have a history of a serious reaction to any previous vaccinations, such as anaphylaxis, encephalopathy within 7 days, Guillain-Oran syndrome within 6 weeks, seizure?     Have you received any live vaccine(s) (e.g MMR, PEREZ) or any other vaccines in the last month (to ensure duplicate doses aren't given)?     Do you have an anaphylactic allergy to latex (DTaP, DTaP-IPV, Hep A, Hep B, MenB, RV, Td, Tdap), baker’s yeast (Hep B, HPV), polysorbates (RSV, nirsevimab, PCV 20, Rotavirrus, Tdap, Shingrix), or gelatin (PEREZ, MMR)?     Do you have an anaphylactic allergy to neomycin (Rabies, PEREZ, MMR, IPV, Hep A), polymyxin B (IPV), or streptomycin (IPV)?      Any cancer, leukemia, AIDS, or other immune system disorder? (PEREZ, MMR, RV)     Do you have a parent, brother, or sister with an immune system problem (if immune competence of vaccine recipient clinically verified, can proceed)? (MMR, PEREZ)     Any recent steroid treatments for >2 weeks, chemotherapy, or radiation treatment? (PEREZ, MMR)     Have you received antibody-containing blood transfusions or IVIG in the past 11 months (recommended interval is dependent on product)? (MMR, PEREZ)     Have you taken antiviral drugs (acyclovir, famciclovir, valacyclovir for PEREZ) in the last 24 or 48 hours, respectively?      Are you pregnant or planning to become  "pregnant within 1 month? (PEREZ, MMR, HPV, IPV, MenB, Abrexvy; For Hep B- refer to Engerix-B; For RSV - Abrysvo is indicated for 32-36 weeks of pregnancy from September to January)     For infants, have you ever been told your child has had intussusception or a medical emergency involving obstruction of the intestine (Rotavirus)? If not for an infant, can skip this question.         *Ordering Physicians/APC should be consulted if \"yes\" is checked by the patient or guardian above.  I have received, read, and understand the Vaccine Information Statement (VIS) for each vaccine ordered.  I have considered my or my child's health status as well as the health status of my close contacts.  I have taken the opportunity to discuss my vaccine questions with my or my child's health care provider.   I have requested that the ordered vaccine(s) be given to me or my child.  I understand the benefits and risks of the vaccines.  I understand that I should remain in the clinic for 15 minutes after receiving the vaccine(s).  _________________________________________________________  Signature of Patient or Parent/Legal Guardian ____________________  Date     "

## 2025-04-25 NOTE — LETTER
[facility]  Vaccine Consent Form    Patient Name:  Polo Mcguire  Patient :  2005     Vaccine(s) Ordered    Tdap Vaccine Greater Than or Equal To 6yo IM        Screening Checklist  The following questions should be completed prior to vaccination. If you answer “yes” to any question, it does not necessarily mean you should not be vaccinated. It just means we may need to clarify or ask more questions. If a question is unclear, please ask your healthcare provider to explain it.    Yes No   Any fever or moderate to severe illness today (mild illness and/or antibiotic treatment are not contraindications)?     Do you have a history of a serious reaction to any previous vaccinations, such as anaphylaxis, encephalopathy within 7 days, Guillain-Enterprise syndrome within 6 weeks, seizure?     Have you received any live vaccine(s) (e.g MMR, PEREZ) or any other vaccines in the last month (to ensure duplicate doses aren't given)?     Do you have an anaphylactic allergy to latex (DTaP, DTaP-IPV, Hep A, Hep B, MenB, RV, Td, Tdap), baker’s yeast (Hep B, HPV), polysorbates (RSV, nirsevimab, PCV 20, Rotavirrus, Tdap, Shingrix), or gelatin (PEREZ, MMR)?     Do you have an anaphylactic allergy to neomycin (Rabies, PEREZ, MMR, IPV, Hep A), polymyxin B (IPV), or streptomycin (IPV)?      Any cancer, leukemia, AIDS, or other immune system disorder? (PEREZ, MMR, RV)     Do you have a parent, brother, or sister with an immune system problem (if immune competence of vaccine recipient clinically verified, can proceed)? (MMR, PEREZ)     Any recent steroid treatments for >2 weeks, chemotherapy, or radiation treatment? (PEREZ, MMR)     Have you received antibody-containing blood transfusions or IVIG in the past 11 months (recommended interval is dependent on product)? (MMR, PEREZ)     Have you taken antiviral drugs (acyclovir, famciclovir, valacyclovir for PEREZ) in the last 24 or 48 hours, respectively?      Are you pregnant or planning to become  "pregnant within 1 month? (PEREZ, MMR, HPV, IPV, MenB, Abrexvy; For Hep B- refer to Engerix-B; For RSV - Abrysvo is indicated for 32-36 weeks of pregnancy from September to January)     For infants, have you ever been told your child has had intussusception or a medical emergency involving obstruction of the intestine (Rotavirus)? If not for an infant, can skip this question.         *Ordering Physicians/APC should be consulted if \"yes\" is checked by the patient or guardian above.  I have received, read, and understand the Vaccine Information Statement (VIS) for each vaccine ordered.  I have considered my or my child's health status as well as the health status of my close contacts.  I have taken the opportunity to discuss my vaccine questions with my or my child's health care provider.   I have requested that the ordered vaccine(s) be given to me or my child.  I understand the benefits and risks of the vaccines.  I understand that I should remain in the clinic for 15 minutes after receiving the vaccine(s).  _________________________________________________________  Signature of Patient or Parent/Legal Guardian ____________________  Date     "

## 2025-04-25 NOTE — PROGRESS NOTES
Patient Care Team:  Bandar Alva APRN as PCP - General (Family Medicine)     Chief complaint: Patient is in today for a physical     Patient is a 20 y.o. male who presents for his yearly physical exam.     HPI Patient in for Physical, Has seen Sleep, awaiting sleep study. Has seen Psych, he is on Cymbalta 30 mg, still very anxious. On Trelegy for asthma, in AM. Using Albuterol inhaler every hour.  He seems to have a lot more asthma issues.  He is taking Singulair 10 mg daily also.  He also has a history of migraine headaches and is seeing a headache specialist.    Review of Systems   Constitutional:  Positive for fatigue. Negative for appetite change, chills and fever.   HENT:  Positive for congestion and rhinorrhea. Negative for ear pain, hearing loss, sinus pressure and sore throat.    Eyes:  Negative for itching and visual disturbance (Glasses,).   Respiratory:  Positive for cough, chest tightness, shortness of breath and wheezing.    Cardiovascular:  Positive for chest pain (Pins-and-needles in left anterior chest). Negative for palpitations and leg swelling.   Gastrointestinal:  Negative for abdominal pain, blood in stool, constipation, diarrhea, nausea and vomiting.   Endocrine: Negative for cold intolerance, heat intolerance, polydipsia, polyphagia and polyuria.   Genitourinary:  Negative for difficulty urinating, dysuria and hematuria.   Musculoskeletal:  Negative for arthralgias, back pain, joint swelling, myalgias and neck pain.   Skin:  Negative for rash and wound.   Allergic/Immunologic: Positive for environmental allergies. Negative for food allergies.   Neurological:  Positive for light-headedness and headaches. Negative for dizziness and numbness.   Hematological:  Negative for adenopathy. Does not bruise/bleed easily.   Psychiatric/Behavioral:  Positive for sleep disturbance. Negative for dysphoric mood. The patient is nervous/anxious.       History  Past Medical History:   Diagnosis Date     Acid reflux     Allergic     Asthma     Migraines       Past Surgical History:   Procedure Laterality Date    HERNIA REPAIR        No Known Allergies   Family History   Problem Relation Age of Onset    Cancer Mother         thyroid and cervical    Obesity Mother     Depression Neg Hx      Social History     Socioeconomic History    Marital status: Single   Tobacco Use    Smoking status: Never    Smokeless tobacco: Never   Vaping Use    Vaping status: Never Used   Substance and Sexual Activity    Alcohol use: Never    Drug use: Never    Sexual activity: Never        Current Outpatient Medications:     Aimovig 70 MG/ML auto-injector, Inject 1 mL under the skin into the appropriate area as directed Every 30 (Thirty) Days., Disp: , Rfl:     DULoxetine (CYMBALTA) 60 MG capsule, Take 1 capsule by mouth Daily., Disp: 90 capsule, Rfl: 1    fluticasone (FLONASE) 50 MCG/ACT nasal spray, Administer 2 sprays into the nostril(s) as directed by provider Daily., Disp: 18.2 g, Rfl: 11    galcanezumab-gnlm (EMGALITY) 120 MG/ML auto-injector pen, Inject 1 mL under the skin into the appropriate area as directed Every 30 (Thirty) Days., Disp: , Rfl:     guaiFENesin (Mucinex) 600 MG 12 hr tablet, Take 2 tablets by mouth 2 (Two) Times a Day., Disp: 60 tablet, Rfl: 2    loratadine (CLARITIN) 10 MG tablet, Take 1 tablet by mouth Daily., Disp: 90 tablet, Rfl: 3    montelukast (Singulair) 10 MG tablet, Take 1 tablet by mouth Every Night., Disp: 90 tablet, Rfl: 3    propranolol (INDERAL) 40 MG tablet, Take 1 tablet by mouth 2 (Two) Times a Day., Disp: 180 tablet, Rfl: 3    SUMAtriptan (IMITREX) 100 MG tablet, Take 1 tablet by mouth., Disp: , Rfl:     Budeson-Glycopyrrol-Formoterol (Breztri Aerosphere) 160-9-4.8 MCG/ACT aerosol inhaler, Inhale 2 puffs 2 (Two) Times a Day., Disp: 10.7 g, Rfl: 5    Immunizations   N/A   Prescribed/Refused   Date     Td/Tdap  (Booster Q 10 yrs)   []           Prescribed    [x]     Refused        []        "    Flu  (Yearly)   [x]        Prescribed    []     Refused        []           Pneumonia      [x]        Prescribed    []     Refused        []                 Hep B     [x]        Prescribed    []     Refused        []           Shingles  (Age 50 and older)   [x]        Prescribed    []     Refused        []           Immunization History   Administered Date(s) Administered    31-influenza Vac Quardvalent Preservativ 09/23/2015, 10/10/2016    COVID-19 (PFIZER) Purple Cap Monovalent 07/02/2021    DTaP, Unspecified 03/13/2009    FluMist 2-49yrs (Nasal) 10/27/2011    Fluzone  >6mos 10/23/2024    Fluzone (or Fluarix & Flulaval for VFC) >6mos 10/10/2016, 02/06/2018, 02/16/2024    Hep A, 2 Dose 10/17/2016, 02/06/2018    Hpv9 10/17/2016, 02/06/2018    IPV 03/13/2009    Influenza Seasonal Injectable 03/13/2009    MCV4 Unspecified 07/06/2015    MMR 03/13/2009    Meningococcal B,(Bexsero) 04/25/2025    Meningococcal MCV4P (Menactra) 04/16/2020    Pneumococcal Conjugate 20-Valent (PCV20) 03/19/2024    Tdap 07/06/2015, 04/25/2025    Varicella 07/06/2015     Health Maintenance   Topic Date Due    COVID-19 Vaccine (2 - 2024-25 season) 10/25/2025 (Originally 9/1/2024)    INFLUENZA VACCINE  07/01/2025    MENINGOCOCCAL B VACCINE (2 of 2 - Bexsero SCDM 2-dose series) 10/25/2025    ANNUAL PHYSICAL  04/25/2026    TDAP/TD VACCINES (3 - Td or Tdap) 04/25/2035    HEPATITIS C SCREENING  Completed    Pneumococcal Vaccine 0-49  Completed    MENINGOCOCCAL VACCINE  Completed    HPV VACCINES  Completed        Diabetes  [] Yes  [x] No   N/A      Date     Eye Exam     []             []   Complete     []   Recommended Date:  Where:       Foot Exam     []         []   Complete          Obesity Counseling     []       []   Complete     No results found for: \"HGBA1C\", \"MICROALBUR\"    Additional Testing      Date     Colorectal Screening       [x]   N/A   []   Complete    []   Ordered       [] Declined Date:    Where:       Pap      [x]   N/A   []   " Complete   []   OB/GYN   [] Declined Date:    Where:       Mammogram        [x]   N/A   []   Complete   []  OB/GYN   []   Ordered   [] Declined Date:    Where:     PSA  (Over age 50)    [x]   N/A   []   Complete   []   Ordered Date:    Where:     US Aorta  (For male smokers, age 65)     [x]   N/A   []   Complete   []   Ordered   [] Declined Date:    Where:     CT for Smoker  (Age 55-75, 30 pk yr)    [x]   N/A   []   Complete   []   Ordered   [] Declined Date:    Where:     Bone Density/DEXA      [x]   N/A   []   Complete   []   Ordered   [] Declined Date:    Follow-up:     Hep. C        []   N/A   [x]   Complete   []   Ordered Date:    Where:     Results for orders placed or performed in visit on 03/31/25   ToxAssure Flex 23, Ur - Urine, Clean Catch    Collection Time: 03/31/25  1:59 PM    Specimen: Urine, Clean Catch    Urine [facility] Code: 91   Result Value Ref Range    Report Summary FINAL     CREATININE 197 mg/dL    Amphetamines, IA Negative CUTOFF:300 ng/mL    Benzodiazepines Negative     Diazepam Urine, Qualitative Not Detected ng/mg creat    Desmethyldiazepam Not Detected ng/mg creat    Oxazepam, urine Not Detected ng/mg creat    Temazepam Not Detected ng/mg creat    Alprazolam Urine, Conf Not Detected ng/mg creat    Alpha-hydroxyalprazolam, Urine Not Detected ng/mg creat    Desalkylflurazepam, Urine Not Detected ng/mg creat    Lorazepam, Urine Not Detected ng/mg creat    Alpha-hydroxytriazolam, Urine Not Detected ng/mg creat    Clonazepam Not Detected ng/mg creat    7- AMINOCLONAZEPAM Not Detected ng/mg creat    Midazolam, Urine Not Detected ng/mg creat    Alpha-hydroxymidazolam, Urine Not Detected ng/mg creat    Flunitrazepam Not Detected ng/mg creat    DESMETHYLFLUNITRAZEPAM Not Detected ng/mg creat    COCAINE / METABOLITE, IA Negative CUTOFF:150 ng/mL    Ethyl Alcohol, Enz Negative CUTOFF:0.020 g/dL    Ethanol and Ethanol Biomarkers Negative CUTOFF:500 ng/mL    Cannavinoids IA Negative CUTOFF:20 ng/mL     "6-Acetylmorphine IA Negative CUTOFF:10 ng/mL    Opiate Class IA Negative CUTOFF:100 ng/mL    Oxycodone Class IA Negative CUTOFF:100 ng/mL    METHADONE, IA Negative CUTOFF:100 ng/mL    Methadone MTB IA Negative CUTOFF:100 ng/mL    Buprenorphine, Urine Negative     Buprenorphine, Urine Not Detected ng/mg creat    Norbuprenorphine Not Detected ng/mg creat    Fentanyl Negative     Fentanyl, Urine Not Detected ng/mg creat    Norfentanyl Urine Not Detected ng/mg creat    Tapentadol, IA Negative CUTOFF:200 ng/mL    MEPERIDINE, IA Negative CUTOFF:200 ng/mL    PROPOXYPHENE, IA Negative CUTOFF:300 ng/mL    TRAMADOL, IA Negative CUTOFF:200 ng/mL    Barbiturates, IA Negative CUTOFF:200 ng/mL    Other Hallucinogens Ur Negative     Ketamine Not Detected     Norketamine Not Detected     PHENCYCLIDINE, IA Negative CUTOFF:25 ng/mL    Gabapentin, IA Negative CUTOFF:1.0 ug/mL    Carisoprodol, IA Negative CUTOFF:100 ng/mL    SEDATIVES/HYPNOTICS Negative     Zolpidem(Ambien) Urine Not Detected     Zolpidem Acid Not Detected     Eszopiclone/Zopiclone Not Detected     Amino Chloropyridine Not Detected     Zaleplon, Ur Not Detected     Acetaminophen, IA Negative CUTOFF:5.0 ug/mL    Miscellaneous, Ur Negative     DEXTROMETHORPHAN Not Detected     Dextrorphan/Levorphanol Not Detected             /82   Pulse 82   Ht 177.8 cm (70\")   Wt 106 kg (233 lb)   SpO2 97%   BMI 33.43 kg/m²       Physical Exam  Vitals and nursing note reviewed.   Constitutional:       General: He is not in acute distress.     Appearance: Normal appearance. He is well-developed. He is not diaphoretic.   HENT:      Head: Normocephalic and atraumatic.      Right Ear: External ear normal.      Left Ear: External ear normal.      Nose: Nose normal.   Eyes:      General: No scleral icterus.        Right eye: No discharge.         Left eye: No discharge.      Conjunctiva/sclera: Conjunctivae normal.      Pupils: Pupils are equal, round, and reactive to light. "   Neck:      Thyroid: No thyromegaly.      Vascular: No JVD (no bruits).      Trachea: No tracheal deviation.   Cardiovascular:      Rate and Rhythm: Normal rate and regular rhythm.      Heart sounds: No murmur heard.     No friction rub. No gallop.   Pulmonary:      Effort: Pulmonary effort is normal. No respiratory distress.      Breath sounds: Normal breath sounds. No wheezing or rales.   Chest:      Chest wall: No tenderness.   Abdominal:      General: Bowel sounds are normal. There is no distension.      Palpations: Abdomen is soft. There is no mass.      Tenderness: There is no abdominal tenderness. There is no guarding or rebound.      Hernia: No hernia is present.   Genitourinary:     Comments: deferred  Musculoskeletal:         General: No tenderness or deformity. Normal range of motion.      Cervical back: Normal range of motion and neck supple.   Lymphadenopathy:      Cervical: No cervical adenopathy.   Skin:     General: Skin is warm and dry.      Coloration: Skin is not pale.      Findings: No erythema or rash.   Neurological:      Mental Status: He is alert and oriented to person, place, and time.      Motor: No abnormal muscle tone.      Deep Tendon Reflexes: Reflexes are normal and symmetric. Reflexes normal.   Psychiatric:         Behavior: Behavior normal.         Thought Content: Thought content normal.         Judgment: Judgment normal.             Counseling provided on weight management, allergic rhinitis, and asthma.    Diagnoses and all orders for this visit:    Wellness examination  -     Comprehensive Metabolic Panel; Future  -     Lipid Panel; Future  -     TSH Rfx On Abnormal To Free T4; Future  -     CBC (No Diff); Future    Need for tetanus booster  -     Tdap Vaccine Greater Than or Equal To 6yo IM    Class 1 obesity due to excess calories with serious comorbidity and body mass index (BMI) of 33.0 to 33.9 in adult    Screening for diabetes mellitus  -     Comprehensive Metabolic Panel;  Future    Screening for lipid disorders  -     Lipid Panel; Future    Vitamin D deficiency  -     Vitamin D,25-Hydroxy; Future    Screening for thyroid disorder  -     TSH Rfx On Abnormal To Free T4; Future    Screening for deficiency anemia  -     CBC (No Diff); Future    Anxiety  -     DULoxetine (CYMBALTA) 60 MG capsule; Take 1 capsule by mouth Daily.    Moderate persistent asthma with acute exacerbation  -     Budeson-Glycopyrrol-Formoterol (Breztri Aerosphere) 160-9-4.8 MCG/ACT aerosol inhaler; Inhale 2 puffs 2 (Two) Times a Day.  -     montelukast (Singulair) 10 MG tablet; Take 1 tablet by mouth Every Night.    Seasonal allergic rhinitis, unspecified trigger  -     montelukast (Singulair) 10 MG tablet; Take 1 tablet by mouth Every Night.  -     fluticasone (FLONASE) 50 MCG/ACT nasal spray; Administer 2 sprays into the nostril(s) as directed by provider Daily.  -     loratadine (CLARITIN) 10 MG tablet; Take 1 tablet by mouth Daily.    Primary hypertension  -     propranolol (INDERAL) 40 MG tablet; Take 1 tablet by mouth 2 (Two) Times a Day.    Chronic migraine with aura without status migrainosus, not intractable  -     propranolol (INDERAL) 40 MG tablet; Take 1 tablet by mouth 2 (Two) Times a Day.    Tremor  -     propranolol (INDERAL) 40 MG tablet; Take 1 tablet by mouth 2 (Two) Times a Day.    Meningococcal group B vaccine administered  -     Cancel: Meningococcal (MENVEO) MCV4O IM  -     Bexsero    The preventative exam has been reviewed in detail.  Counseling/Anticipatory Guidance discussion included discussing nutrition needs, physical activity, healthy weight, injury prevention, misuse of tobacco, alcohol and drugs, sexual behavior, dental health, mental health, immunizations, and needed screenings has been discussed. The patient has been assisted with scheduling healthcare procedures for the coming year and given a written document outlining these recommendations. Age-appropriate screening measures have  been ordered for the patient today as indicated above.    Will obtain routine testing today and make further recommendations pending results. All results are automatically released to [email or web address] immediately when they return whether they have been reviewed or not. The patient will be notified about the results, regardless of the findings. If they have not been contacted by the office within 2 weeks after the test has been performed, I want them to contact us to learn about the results.    Discussed need for weight management.  I recommend they use a weight loss jose david on their phone, eat no more than [id number] earline/day, or use intermittent fasting and exercise 30 to 60 minutes 3 times a week.  Discussed weight loss with diet, recommend Weight Watchers or Mediterranean Diet, but stated that whatever method works for this patient is best. The patient agrees with all recommendations at this time. I have discussed a weight loss plan to be determined by this patient.     To try to improve his asthma we will put him on a Breztri inhaler.Patient was encouraged to keep me informed of any acute changes, lack of improvement, or any new concerning symptoms.  If patient becomes concerned, or has any worsening symptoms, they are to report either here, urgent treatment, or emergency room. Plan of care discussed with pt. They verbalized understanding and agreement.     To work on improving his anxiety we will increase the duloxetine to 60 mg daily.  I recommend he continue following up with counseling. Patient was given instructions on medication prescribed, including how to take, common side effects, and when it will have full effectiveness. The patient was instructed to call my office if any side effects are noted. The patient was also instructed to stop this medication if their symptoms worsen, their mood worsens, or any desire to hurt someone or themselves. Patient was given my card and informed how to contact me if needed or call my  office.     Return visit 1 month    Bandar Alva, APRN   4/25/2025   14:38 EDT          Please note that portions of this document were completed with a voice recognition program.     At [facility], we believe that sharing information builds trust and better relationships. You are receiving this note because you are receiving care at [facility] or have recently visited. It is possible you will see health information before a provider has talked with you about it. This kind of information can be easy to misunderstand. To help you fully understand what it means for your health, we urge you to discuss this note with your provider.

## 2025-04-25 NOTE — LETTER
[facility]  Vaccine Consent Form    Patient Name:  Polo Mcguire  Patient :  2005     Vaccine(s) Ordered    Tdap Vaccine Greater Than or Equal To 6yo IM        Screening Checklist  The following questions should be completed prior to vaccination. If you answer “yes” to any question, it does not necessarily mean you should not be vaccinated. It just means we may need to clarify or ask more questions. If a question is unclear, please ask your healthcare provider to explain it.    Yes No   Any fever or moderate to severe illness today (mild illness and/or antibiotic treatment are not contraindications)?     Do you have a history of a serious reaction to any previous vaccinations, such as anaphylaxis, encephalopathy within 7 days, Guillain-Cecil syndrome within 6 weeks, seizure?     Have you received any live vaccine(s) (e.g MMR, PEREZ) or any other vaccines in the last month (to ensure duplicate doses aren't given)?     Do you have an anaphylactic allergy to latex (DTaP, DTaP-IPV, Hep A, Hep B, MenB, RV, Td, Tdap), baker’s yeast (Hep B, HPV), polysorbates (RSV, nirsevimab, PCV 20, Rotavirrus, Tdap, Shingrix), or gelatin (PEREZ, MMR)?     Do you have an anaphylactic allergy to neomycin (Rabies, PEREZ, MMR, IPV, Hep A), polymyxin B (IPV), or streptomycin (IPV)?      Any cancer, leukemia, AIDS, or other immune system disorder? (PEREZ, MMR, RV)     Do you have a parent, brother, or sister with an immune system problem (if immune competence of vaccine recipient clinically verified, can proceed)? (MMR, PEREZ)     Any recent steroid treatments for >2 weeks, chemotherapy, or radiation treatment? (PEREZ, MMR)     Have you received antibody-containing blood transfusions or IVIG in the past 11 months (recommended interval is dependent on product)? (MMR, PEREZ)     Have you taken antiviral drugs (acyclovir, famciclovir, valacyclovir for PEREZ) in the last 24 or 48 hours, respectively?      Are you pregnant or planning to become  "pregnant within 1 month? (PEREZ, MMR, HPV, IPV, MenB, Abrexvy; For Hep B- refer to Engerix-B; For RSV - Abrysvo is indicated for 32-36 weeks of pregnancy from September to January)     For infants, have you ever been told your child has had intussusception or a medical emergency involving obstruction of the intestine (Rotavirus)? If not for an infant, can skip this question.         *Ordering Physicians/APC should be consulted if \"yes\" is checked by the patient or guardian above.  I have received, read, and understand the Vaccine Information Statement (VIS) for each vaccine ordered.  I have considered my or my child's health status as well as the health status of my close contacts.  I have taken the opportunity to discuss my vaccine questions with my or my child's health care provider.   I have requested that the ordered vaccine(s) be given to me or my child.  I understand the benefits and risks of the vaccines.  I understand that I should remain in the clinic for 15 minutes after receiving the vaccine(s).  _________________________________________________________  Signature of Patient or Parent/Legal Guardian ____________________  Date     "

## 2025-04-25 NOTE — LETTER
[facility]  Vaccine Consent Form    Patient Name:  Polo Mcguire  Patient :  2005     Vaccine(s) Ordered    Bexsero  Tdap Vaccine Greater Than or Equal To 6yo IM        Screening Checklist  The following questions should be completed prior to vaccination. If you answer “yes” to any question, it does not necessarily mean you should not be vaccinated. It just means we may need to clarify or ask more questions. If a question is unclear, please ask your healthcare provider to explain it.    Yes No   Any fever or moderate to severe illness today (mild illness and/or antibiotic treatment are not contraindications)?     Do you have a history of a serious reaction to any previous vaccinations, such as anaphylaxis, encephalopathy within 7 days, Guillain-Memphis syndrome within 6 weeks, seizure?     Have you received any live vaccine(s) (e.g MMR, PEREZ) or any other vaccines in the last month (to ensure duplicate doses aren't given)?     Do you have an anaphylactic allergy to latex (DTaP, DTaP-IPV, Hep A, Hep B, MenB, RV, Td, Tdap), baker’s yeast (Hep B, HPV), polysorbates (RSV, nirsevimab, PCV 20, Rotavirrus, Tdap, Shingrix), or gelatin (PEREZ, MMR)?     Do you have an anaphylactic allergy to neomycin (Rabies, PEREZ, MMR, IPV, Hep A), polymyxin B (IPV), or streptomycin (IPV)?      Any cancer, leukemia, AIDS, or other immune system disorder? (PEREZ, MMR, RV)     Do you have a parent, brother, or sister with an immune system problem (if immune competence of vaccine recipient clinically verified, can proceed)? (MMR, PEREZ)     Any recent steroid treatments for >2 weeks, chemotherapy, or radiation treatment? (PEREZ, MMR)     Have you received antibody-containing blood transfusions or IVIG in the past 11 months (recommended interval is dependent on product)? (MMR, PEREZ)     Have you taken antiviral drugs (acyclovir, famciclovir, valacyclovir for PEREZ) in the last 24 or 48 hours, respectively?      Are you pregnant or planning to  "become pregnant within 1 month? (PEREZ, MMR, HPV, IPV, MenB, Abrexvy; For Hep B- refer to Engerix-B; For RSV - Abrysvo is indicated for 32-36 weeks of pregnancy from September to January)     For infants, have you ever been told your child has had intussusception or a medical emergency involving obstruction of the intestine (Rotavirus)? If not for an infant, can skip this question.         *Ordering Physicians/APC should be consulted if \"yes\" is checked by the patient or guardian above.  I have received, read, and understand the Vaccine Information Statement (VIS) for each vaccine ordered.  I have considered my or my child's health status as well as the health status of my close contacts.  I have taken the opportunity to discuss my vaccine questions with my or my child's health care provider.   I have requested that the ordered vaccine(s) be given to me or my child.  I understand the benefits and risks of the vaccines.  I understand that I should remain in the clinic for 15 minutes after receiving the vaccine(s).  _________________________________________________________  Signature of Patient or Parent/Legal Guardian ____________________  Date     "

## 2025-05-09 DIAGNOSIS — J45.41 MODERATE PERSISTENT ASTHMA WITH ACUTE EXACERBATION: ICD-10-CM

## 2025-05-09 NOTE — TELEPHONE ENCOUNTER
Caller: [facility] [place] [phone] [place] [phone] FX    Relationship: Pharmacy    Best call back number:  [phone]    Requested Prescriptions:   Requested Prescriptions     Pending Prescriptions Disp Refills    Budeson-Glycopyrrol-Formoterol (Breztri Aerosphere) 160-9-4.8 MCG/ACT aerosol inhaler 10.7 g 5     Sig: Inhale 2 puffs 2 (Two) Times a Day.        Pharmacy where request should be sent: [place] [phone] [place] [phone] FX     Last office visit with prescribing clinician: 4/25/2025   Last telemedicine visit with prescribing clinician: Visit date not found   Next office visit with prescribing clinician: 5/29/2025       Does the patient have less than a 3 day supply:  [x] Yes  [] No    Would you like a call back once the refill request has been completed: [] Yes [x] No    If the office needs to give you a call back, can they leave a voicemail: [] Yes [] No

## 2025-05-12 RX ORDER — BUDESONIDE, GLYCOPYRROLATE, AND FORMOTEROL FUMARATE 160; 9; 4.8 UG/1; UG/1; UG/1
2 AEROSOL, METERED RESPIRATORY (INHALATION) 2 TIMES DAILY
Qty: 10.7 G | Refills: 5 | Status: SHIPPED | OUTPATIENT
Start: 2025-05-12

## 2025-05-14 ENCOUNTER — OFFICE VISIT (OUTPATIENT)
Age: 20
End: 2025-05-14
Payer: COMMERCIAL

## 2025-05-14 VITALS
SYSTOLIC BLOOD PRESSURE: 116 MMHG | WEIGHT: 235 LBS | BODY MASS INDEX: 33.64 KG/M2 | HEIGHT: 70 IN | HEART RATE: 82 BPM | OXYGEN SATURATION: 97 % | DIASTOLIC BLOOD PRESSURE: 80 MMHG

## 2025-05-14 DIAGNOSIS — F41.1 GENERALIZED ANXIETY DISORDER: Primary | ICD-10-CM

## 2025-05-14 DIAGNOSIS — F43.10 PTSD (POST-TRAUMATIC STRESS DISORDER): ICD-10-CM

## 2025-05-14 DIAGNOSIS — Z91.51 HISTORY OF SUICIDE ATTEMPT: ICD-10-CM

## 2025-05-14 DIAGNOSIS — F33.0 MILD EPISODE OF RECURRENT MAJOR DEPRESSIVE DISORDER: ICD-10-CM

## 2025-05-14 NOTE — PROGRESS NOTES
Follow Up Office Visit      Patient Name: Polo Mcguire  : 2005   MRN: [id number]     Referring Provider: Bandar Alva APRN    Chief Complaint:      ICD-10-CM ICD-9-CM   1. Generalized anxiety disorder  F41.1 300.02   2. Mild episode of recurrent major depressive disorder  F33.0 296.31   3. History of suicide attempt  Z91.51 V11.8   4. PTSD (post-traumatic stress disorder)  F43.10 309.81        History of Present Illness:   Polo Mcguire is a 20 y.o. male who is here today for follow up and medication management.           Subjective      Patient Reports:   History of Present Illness  He reports a chaotic home environment, with his mother exhibiting increased behavior due to a lack of bipolar medication. He tends to isolate himself in his room, engaging in activities such as watching television or interacting with his dog. He experiences increased irritability, particularly when returning home. He has not experienced any panic attacks. He has been on an increased dose of Cymbalta 60 mg, but has not been taking it consistently. He expresses concern about the potential changes that medication may bring, as he wants to be himself. Last time he took his medication was 2 days ago.    Despite attempts to lose weight, he continues to gain, which he finds perplexing. He maintains a daily exercise routine. He continues to experience depressive symptoms, even while attempting to implement beneficial lifestyle changes. He reports a loss of contact with a close friend, which has been distressing. He does not endorse feelings of hopelessness or loneliness. He denies SI/HI/AVH.    MEDICATIONS  Cymbalta    PHQ-9= 18 increased score from previous visit  BLADIMIR-7=11 decreased score from previous visit    Review of Systems:   Review of Systems   Constitutional:  Negative for appetite change, fatigue and unexpected weight change.   Eyes:  Negative for visual disturbance.   Respiratory:  Negative for chest  tightness and shortness of breath.    Cardiovascular:  Negative for chest pain.   Musculoskeletal:  Negative for gait problem.   Skin:  Negative for rash and wound.   Neurological:  Negative for dizziness, tremors, seizures, weakness, light-headedness and headaches.   Psychiatric/Behavioral:  Positive for agitation and dysphoric mood. Negative for behavioral problems, confusion, decreased concentration, hallucinations, self-injury, sleep disturbance and suicidal ideas. The patient is not nervous/anxious and is not hyperactive.    Sleep pattern: 7-10 hours of sleep per night   Appetite: normal     PHQ-9 Depression Screening  Little interest or pleasure in doing things? Over half   Feeling down, depressed, or hopeless? Over half   PHQ-2 Total Score 4   Trouble falling or staying asleep, or sleeping too much? Almost all   Feeling tired or having little energy? Over half   Poor appetite or overeating? Almost all   Feeling bad about yourself - or that you are a failure or have let yourself or your family down? Over half   Trouble concentrating on things, such as reading the newspaper or watching television? Several days   Moving or speaking so slowly that other people could have noticed? Or the opposite - being so fidgety or restless that you have been moving around a lot more than usual? Over half   Thoughts that you would be better off dead, or of hurting yourself in some way? Several days   PHQ-9 Total Score 18   If you checked off any problems, how difficult have these problems made it for you to do your work, take care of things at home, or get along with other people? Somewhat difficult       BLADIMIR-7 Anxiety Screening  Over the last two weeks, how often have you been bothered by the following problems?  Feeling nervous, anxious or on edge: Several days  Not being able to stop or control worrying: Several days  Worrying too much about different things: More than half the days  Trouble Relaxing: More than half the  days  Being so restless that it is hard to sit still: Several days  Becoming easily annoyed or irritable: Nearly every day  Feeling afraid as if something awful might happen: Several days  BLADIMIR 7 Total Score: 11  If you checked any problems, how difficult have these problems made it for you to do your work, take care of things at home, or get along with other people: Somewhat difficult    RISK ASSESSMENT:  Patient denies any thoughts or intent of suicide today. Patient denies any impulsive behavior today.     Medications:     Current Outpatient Medications:     Aimovig 70 MG/ML auto-injector, Inject 1 mL under the skin into the appropriate area as directed Every 30 (Thirty) Days., Disp: , Rfl:     Budeson-Glycopyrrol-Formoterol (Breztri Aerosphere) 160-9-4.8 MCG/ACT aerosol inhaler, Inhale 2 puffs 2 (Two) Times a Day., Disp: 10.7 g, Rfl: 5    DULoxetine (CYMBALTA) 60 MG capsule, Take 1 capsule by mouth Daily., Disp: 90 capsule, Rfl: 1    fluticasone (FLONASE) 50 MCG/ACT nasal spray, Administer 2 sprays into the nostril(s) as directed by provider Daily., Disp: 18.2 g, Rfl: 11    galcanezumab-gnlm (EMGALITY) 120 MG/ML auto-injector pen, Inject 1 mL under the skin into the appropriate area as directed Every 30 (Thirty) Days., Disp: , Rfl:     guaiFENesin (Mucinex) 600 MG 12 hr tablet, Take 2 tablets by mouth 2 (Two) Times a Day., Disp: 60 tablet, Rfl: 2    loratadine (CLARITIN) 10 MG tablet, Take 1 tablet by mouth Daily., Disp: 90 tablet, Rfl: 3    montelukast (Singulair) 10 MG tablet, Take 1 tablet by mouth Every Night., Disp: 90 tablet, Rfl: 3    propranolol (INDERAL) 40 MG tablet, Take 1 tablet by mouth 2 (Two) Times a Day., Disp: 180 tablet, Rfl: 3    SUMAtriptan (IMITREX) 100 MG tablet, Take 1 tablet by mouth., Disp: , Rfl:     Medication Considerations:  DELORES reviewed and appropriate.      Allergies:   No Known Allergies    Results Reviewed:   Yes     Objective     Physical Exam:  Vital Signs:   Vitals:     "05/14/25 1516   BP: 116/80   Pulse: 82   SpO2: 97%   Weight: 107 kg (235 lb)   Height: 177.8 cm (70\")     Body mass index is 33.72 kg/m².     Mental Status Exam:   MENTAL STATUS EXAM   General Appearance:  Cleanly groomed and dressed and well developed  Eye Contact:  Good eye contact  Attitude:  Cooperative  Motor Activity:  Normal gait, posture  Muscle Strength:  Normal  Speech:  Normal rate, tone, volume  Language:  Spontaneous  Mood and affect:  Depressed and frustrated  Hopelessness:  Denies  Loneliness: 5  Thought Process:  Logical  Associations/ Thought Content:  No delusions  Hallucinations:  None  Suicidal Ideations:  Not present  Homicidal Ideation:  Not present  Sensorium:  Alert and clear  Orientation:  Person, place, time and situation  Immediate Recall, Recent, and Remote Memory:  Intact  Attention Span/ Concentration:  Good  Fund of Knowledge:  Appropriate for age and educational level  Intellectual Functioning:  Average range  Insight:  Fair  Judgement:  Fair  Reliability:  Fair  Impulse Control:  Fair       @RESULASTCBCDIFFPANEL,TSH,LABLIPI,IAKPEQXO21,UIHVXXRA62,MG,FOLATE,PROLACTIN,CRPRESULT,CMP,J4MDCBWLXLV)@    Lab Results   Component Value Date    GLUCOSE 83 07/23/2024    BUN 11 07/23/2024    CREATININE 0.90 07/23/2024    EGFR 125.4 07/23/2024    BCR 12.2 07/23/2024    K 4.6 07/23/2024    CO2 25.4 07/23/2024    CALCIUM 9.3 07/23/2024    ALBUMIN 4.4 07/23/2024    BILITOT 0.6 07/23/2024    AST 14 07/23/2024    ALT 21 07/23/2024       Lab Results   Component Value Date    WBC 11.17 (H) 07/23/2024    HGB 16.8 07/23/2024    HCT 51.7 (H) 07/23/2024    MCV 89.6 07/23/2024     07/23/2024       Lab Results   Component Value Date    CHOL 150 07/23/2024    TRIG 75 07/23/2024    HDL 47 07/23/2024    LDL 88 07/23/2024       Assessment / Plan      Visit Diagnosis/Orders Placed This Visit:  Diagnoses and all orders for this visit:    1. Generalized anxiety disorder (Primary)    2. Mild episode of recurrent " major depressive disorder    3. History of suicide attempt    4. PTSD (post-traumatic stress disorder)         Assessment & Plan  1. Anxiety.  He reports increased anxiety and taking his medication inconsistently. The dosage of Cymbalta was previously increased from 30 mg to 60 mg by his PCP. He is advised to take his Cymbalta 60 mg consistently to achieve stabilization. If symptoms persist after consistent use, further adjustments will be considered.    2. Depression.  He continues to experience depressive symptoms, including irritability and frustration. He is encouraged to engage in activities outside his room, such as walking his dog or socializing at the dog park, to help alleviate symptoms. Consistent medication use is also emphasized to manage his mood effectively.      Functional Status: Mild impairment     Prognosis: Good with Ongoing Treatment     Impression/Formulation:  Patient appeared alert and oriented.  Patient is voluntarily requesting to continue outpatient psychiatric treatment at [facility] [address] [facility].  Patient is receptive to assistance with maintaining a stable lifestyle.  Patient presents with history of     ICD-10-CM ICD-9-CM   1. Generalized anxiety disorder  F41.1 300.02   2. Mild episode of recurrent major depressive disorder  F33.0 296.31   3. History of suicide attempt  Z91.51 V11.8   4. PTSD (post-traumatic stress disorder)  F43.10 309.81   .    Reviewed patient's previous provider notes. Reviewed most recent labs. Patient meets DSM V diagnostic criteria for diagnoses. Diagnoses may be updated as more information becomes available.       Treatment Plan:   Continue with Cymbalta 60mg PO qd.  PCP increased medication dose on 4/25  Develop routines to aid with medication compliance. (Plans to set medication by cell phone)   Encouraged to pursue psychotherapy  Follow-up in 2 months and as needed    Patient will continue supportive psychotherapy efforts and  medications as indicated. Clinic will obtain release of information for current treatment team for continuity of care as needed. Patient will contact this office, call 911 or present to the nearest emergency room should suicidal or homicidal ideations occur.  Discussed medication options and treatment plan of prescribed medication(s) as well as the risks, benefits, and potential side effects. Patient acknowledged and verbally consented to continue with current treatment plan and was educated on the importance of compliance with treatment and follow-up appointments.     Instructed will take 4-6 weeks for full therapeutic effect. Medication risks and side effects discussed with patient including risk for worsening mood, changes in behavior, thoughts of suicide or homicide, induction of enrrique, serotonin syndrome, rare hyponatremia, rare SIADH, withdrawal symptoms if abrupt withdrawal, sexual dysfunction.   If any thoughts of SI or HI, worsening mood or changes in behavior, call 911 or crisis line [phone], or go to nearest ER at once. Patient agrees to notify close family/friend of new trial of antidepressants/info above. Pt.verbalizes understanding and consents to treatment with this medication.    Quality Measures:   Never smoker    I advised Polo Mcguire of the risks of tobacco use.     Follow Up:   Return in about 2 months (around 7/14/2025).      Patient or patient representative verbalized consent for the use of Ambient Listening during the visit with  MARC Babb for chart documentation. 5/14/2025  15:11 EDT    MARC Babb  [facility] [address]

## 2025-05-16 ENCOUNTER — PRIOR AUTHORIZATION (OUTPATIENT)
Age: 20
End: 2025-05-16
Payer: COMMERCIAL

## 2025-05-16 NOTE — TELEPHONE ENCOUNTER
Drug: Breztri [facility] 160-9-4.8MCG/ACT  Key: [id number]  Status: sent to plan

## 2025-05-19 ENCOUNTER — PRIOR AUTHORIZATION (OUTPATIENT)
Age: 20
End: 2025-05-19
Payer: COMMERCIAL

## 2025-05-19 DIAGNOSIS — J45.41 MODERATE PERSISTENT ASTHMA WITH ACUTE EXACERBATION: Primary | ICD-10-CM

## 2025-05-20 RX ORDER — FLUTICASONE FUROATE, UMECLIDINIUM BROMIDE AND VILANTEROL TRIFENATATE 100; 62.5; 25 UG/1; UG/1; UG/1
1 POWDER RESPIRATORY (INHALATION)
Qty: 60 EACH | Refills: 2 | Status: SHIPPED | OUTPATIENT
Start: 2025-05-20

## 2025-05-20 NOTE — TELEPHONE ENCOUNTER
Denied, This request has not been approved. Based on the information we received, you did not meet the specific rule(s) needed to approve this request. In some cases, the requested drug or alternatives offered may have other guideline rules that need to be met. Our guideline named COPD (CHRONIC OBSTRUCTIVE PULMONARY DISEASE) AGENTS - BREZTRI requires the following rule(s) be met for approval: A. The member has a diagnosis of chronic [long-term] obstructive pulmonary disorder (COPD) [a type of lung condition]. B. The member has had at least a 2-week trial and failure (e.g., limited ability to use or comply with multiple devices) of triple-ingredient therapy (such as, glucocorticoid, long-acting beta agonist, and long-acting muscarinic antagonist) among single- and dual-ingredient inhalers (e.g., Flovent HFA and Anoro Ellipta or Stiolto Respimat).This is why your request is denied. Please work with your provider to use a different medication or get us more information if it will allow us to approve this request. A written notification letter will follow with additional details.

## 2025-05-29 ENCOUNTER — OFFICE VISIT (OUTPATIENT)
Age: 20
End: 2025-05-29
Payer: COMMERCIAL

## 2025-05-29 VITALS
SYSTOLIC BLOOD PRESSURE: 106 MMHG | DIASTOLIC BLOOD PRESSURE: 80 MMHG | WEIGHT: 237.8 LBS | BODY MASS INDEX: 34.04 KG/M2 | HEIGHT: 70 IN | OXYGEN SATURATION: 98 % | HEART RATE: 79 BPM

## 2025-05-29 DIAGNOSIS — E66.811 CLASS 1 OBESITY DUE TO EXCESS CALORIES WITH SERIOUS COMORBIDITY AND BODY MASS INDEX (BMI) OF 33.0 TO 33.9 IN ADULT: ICD-10-CM

## 2025-05-29 DIAGNOSIS — E66.09 CLASS 1 OBESITY DUE TO EXCESS CALORIES WITH SERIOUS COMORBIDITY AND BODY MASS INDEX (BMI) OF 33.0 TO 33.9 IN ADULT: ICD-10-CM

## 2025-05-29 DIAGNOSIS — J45.40 MODERATE PERSISTENT ASTHMA WITHOUT COMPLICATION: ICD-10-CM

## 2025-05-29 DIAGNOSIS — F41.9 ANXIETY: Primary | ICD-10-CM

## 2025-05-29 PROCEDURE — 1159F MED LIST DOCD IN RCRD: CPT | Performed by: NURSE PRACTITIONER

## 2025-05-29 PROCEDURE — 3074F SYST BP LT 130 MM HG: CPT | Performed by: NURSE PRACTITIONER

## 2025-05-29 PROCEDURE — 99214 OFFICE O/P EST MOD 30 MIN: CPT | Performed by: NURSE PRACTITIONER

## 2025-05-29 PROCEDURE — 3079F DIAST BP 80-89 MM HG: CPT | Performed by: NURSE PRACTITIONER

## 2025-05-29 PROCEDURE — 1160F RVW MEDS BY RX/DR IN RCRD: CPT | Performed by: NURSE PRACTITIONER

## 2025-05-29 RX ORDER — FLUTICASONE FUROATE, UMECLIDINIUM BROMIDE AND VILANTEROL TRIFENATATE 100; 62.5; 25 UG/1; UG/1; UG/1
1 POWDER RESPIRATORY (INHALATION)
Qty: 60 EACH | Refills: 5 | Status: SHIPPED | OUTPATIENT
Start: 2025-05-29

## 2025-05-29 NOTE — PROGRESS NOTES
Chief Complaint   Patient presents with    medication follow up       Subjective   Polo Mcguire is a 20 y.o. male    History of Present Illness  Patient is a to follow-up on anxiety/depression, asthma.  At the last visit we did increase his duloxetine to 60 mg daily to help improve his anxiety issues as well as put him on a Breztri inhaler for his asthma.  He never received the inhaler. He reports improved anxiety symptoms with increased dose of Cymbalta.  His weight is actually up 2 pounds from the last visit.    No Known Allergies  Past Medical History:   Diagnosis Date    Acid reflux     Allergic     Asthma     Migraines       Past Surgical History:   Procedure Laterality Date    HERNIA REPAIR       Social History     Socioeconomic History    Marital status: Single   Tobacco Use    Smoking status: Never    Smokeless tobacco: Never   Vaping Use    Vaping status: Never Used   Substance and Sexual Activity    Alcohol use: Never    Drug use: Never    Sexual activity: Never        Current Outpatient Medications:     Aimovig 70 MG/ML auto-injector, Inject 1 mL under the skin into the appropriate area as directed Every 30 (Thirty) Days., Disp: , Rfl:     DULoxetine (CYMBALTA) 60 MG capsule, Take 1 capsule by mouth Daily., Disp: 90 capsule, Rfl: 1    fluticasone (FLONASE) 50 MCG/ACT nasal spray, Administer 2 sprays into the nostril(s) as directed by provider Daily., Disp: 18.2 g, Rfl: 11    galcanezumab-gnlm (EMGALITY) 120 MG/ML auto-injector pen, Inject 1 mL under the skin into the appropriate area as directed Every 30 (Thirty) Days., Disp: , Rfl:     guaiFENesin (Mucinex) 600 MG 12 hr tablet, Take 2 tablets by mouth 2 (Two) Times a Day., Disp: 60 tablet, Rfl: 2    loratadine (CLARITIN) 10 MG tablet, Take 1 tablet by mouth Daily., Disp: 90 tablet, Rfl: 3    montelukast (Singulair) 10 MG tablet, Take 1 tablet by mouth Every Night., Disp: 90 tablet, Rfl: 3    propranolol (INDERAL) 40 MG tablet, Take 1 tablet by  mouth 2 (Two) Times a Day., Disp: 180 tablet, Rfl: 3    SUMAtriptan (IMITREX) 100 MG tablet, Take 1 tablet by mouth., Disp: , Rfl:     Fluticasone-Umeclidin-Vilant (Trelegy Ellipta) 100-62.5-25 MCG/ACT inhaler, Inhale 1 puff Daily., Disp: 60 each, Rfl: 5     Review of Systems   Constitutional:  Negative for chills, fatigue, fever and unexpected weight change.   Respiratory:  Negative for cough, chest tightness, shortness of breath and wheezing.    Cardiovascular:  Negative for chest pain, palpitations and leg swelling.   Gastrointestinal:  Negative for constipation, diarrhea, nausea and vomiting.   Genitourinary:  Negative for difficulty urinating and dysuria.   Skin:  Negative for color change and rash.   Neurological:  Negative for dizziness, syncope, weakness and headaches.   Psychiatric/Behavioral:  Negative for sleep disturbance.        Objective     Vitals:    05/29/25 0914   BP: 106/80   Pulse: 79   SpO2: 98%         05/29/25 0914   Weight: 108 kg (237 lb 12.8 oz)     Body mass index is 34.12 kg/m².  Results for orders placed or performed in visit on 03/31/25   ToxAssure Flex 23, Ur - Urine, Clean Catch    Collection Time: 03/31/25  1:59 PM    Specimen: Urine, Clean Catch    Urine [facility] Code: 91   Result Value Ref Range    Report Summary FINAL     CREATININE 197 mg/dL    Amphetamines, IA Negative CUTOFF:300 ng/mL    Benzodiazepines Negative     Diazepam Urine, Qualitative Not Detected ng/mg creat    Desmethyldiazepam Not Detected ng/mg creat    Oxazepam, urine Not Detected ng/mg creat    Temazepam Not Detected ng/mg creat    Alprazolam Urine, Conf Not Detected ng/mg creat    Alpha-hydroxyalprazolam, Urine Not Detected ng/mg creat    Desalkylflurazepam, Urine Not Detected ng/mg creat    Lorazepam, Urine Not Detected ng/mg creat    Alpha-hydroxytriazolam, Urine Not Detected ng/mg creat    Clonazepam Not Detected ng/mg creat    7- AMINOCLONAZEPAM Not Detected ng/mg creat    Midazolam, Urine Not Detected ng/mg  creat    Alpha-hydroxymidazolam, Urine Not Detected ng/mg creat    Flunitrazepam Not Detected ng/mg creat    DESMETHYLFLUNITRAZEPAM Not Detected ng/mg creat    COCAINE / METABOLITE, IA Negative CUTOFF:150 ng/mL    Ethyl Alcohol, Enz Negative CUTOFF:0.020 g/dL    Ethanol and Ethanol Biomarkers Negative CUTOFF:500 ng/mL    Cannavinoids IA Negative CUTOFF:20 ng/mL    6-Acetylmorphine IA Negative CUTOFF:10 ng/mL    Opiate Class IA Negative CUTOFF:100 ng/mL    Oxycodone Class IA Negative CUTOFF:100 ng/mL    METHADONE, IA Negative CUTOFF:100 ng/mL    Methadone MTB IA Negative CUTOFF:100 ng/mL    Buprenorphine, Urine Negative     Buprenorphine, Urine Not Detected ng/mg creat    Norbuprenorphine Not Detected ng/mg creat    Fentanyl Negative     Fentanyl, Urine Not Detected ng/mg creat    Norfentanyl Urine Not Detected ng/mg creat    Tapentadol, IA Negative CUTOFF:200 ng/mL    MEPERIDINE, IA Negative CUTOFF:200 ng/mL    PROPOXYPHENE, IA Negative CUTOFF:300 ng/mL    TRAMADOL, IA Negative CUTOFF:200 ng/mL    Barbiturates, IA Negative CUTOFF:200 ng/mL    Other Hallucinogens Ur Negative     Ketamine Not Detected     Norketamine Not Detected     PHENCYCLIDINE, IA Negative CUTOFF:25 ng/mL    Gabapentin, IA Negative CUTOFF:1.0 ug/mL    Carisoprodol, IA Negative CUTOFF:100 ng/mL    SEDATIVES/HYPNOTICS Negative     Zolpidem(Ambien) Urine Not Detected     Zolpidem Acid Not Detected     Eszopiclone/Zopiclone Not Detected     Amino Chloropyridine Not Detected     Zaleplon, Ur Not Detected     Acetaminophen, IA Negative CUTOFF:5.0 ug/mL    Miscellaneous, Ur Negative     DEXTROMETHORPHAN Not Detected     Dextrorphan/Levorphanol Not Detected        Physical Exam  Vitals reviewed.   Constitutional:       Appearance: He is well-developed.   HENT:      Head: Normocephalic and atraumatic.   Cardiovascular:      Rate and Rhythm: Normal rate and regular rhythm.      Heart sounds: Normal heart sounds.   Pulmonary:      Effort: Pulmonary effort is  normal.      Breath sounds: Normal breath sounds.   Skin:     General: Skin is warm and dry.   Neurological:      Mental Status: He is alert and oriented to person, place, and time.   Psychiatric:         Behavior: Behavior normal.         Assessment & Plan   Problems Addressed this Visit          Endocrine and Metabolic    Class 1 obesity due to excess calories with serious comorbidity and body mass index (BMI) of 33.0 to 33.9 in adult       Mental Health    Anxiety - Primary       Pulmonary and Pneumonias    Moderate persistent asthma without complication    Relevant Medications    Fluticasone-Umeclidin-Vilant (Trelegy Ellipta) 100-62.5-25 MCG/ACT inhaler     Diagnoses         Codes Comments      Anxiety    -  Primary ICD-10-CM: F41.9  ICD-9-CM: 300.00       Moderate persistent asthma without complication     ICD-10-CM: J45.40  ICD-9-CM: 493.90       Class 1 obesity due to excess calories with serious comorbidity and body mass index (BMI) of 33.0 to 33.9 in adult     ICD-10-CM: E66.811, E66.09, Z68.33  ICD-9-CM: 278.00, V85.33         Discussed need for weight management.  I recommend they use a weight loss jose david on their phone, eat no more than [id number] earline/day, or use intermittent fasting and exercise 30 to 60 minutes 3 times a week.  Discussed weight loss with diet, recommend Weight Watchers or Mediterranean Diet, but stated that whatever method works for this patient is best. The patient agrees with all recommendations at this time. I have discussed a weight loss plan to be determined by this patient.     I discussed the need for them to continue with their current medication. They are to keep me informed of any worsening symptoms and if necessary return to my office or go to the emergency department.    To work on improving his asthma we will put him on a Trelegy inhaler. Patient was encouraged to keep me informed of any acute changes, lack of improvement, or any new concerning symptoms.  If patient becomes  concerned, or has any worsening symptoms, they are to report either here, urgent treatment, or emergency room. Plan of care discussed with pt. They verbalized understanding and agreement.     Return visit in 3 months    Counseling provided on weight management, mental health concerns, and asthma.    Bandar Cuellar Artie, APRN   5/29/2025   09:25 EDT     Please note that portions of this document were completed with a voice recognition program.     At [facility], we believe that sharing information builds trust and better relationships. You are receiving this note because you are receiving care at [facility] or have recently visited. It is possible you will see health information before a provider has talked with you about it. This kind of information can be easy to misunderstand as it is a medical document. It is intended as [id number]-sy-gqww communication. It is written in medical language and may contain abbreviations or verbiage that are unfamiliar. It may appear blunt or direct. Medical documents are intended to carry relevant information, facts as evident, and the clinical opinion of the practitioner.  To help you fully understand what it means for your health, we urge you to discuss this note with your provider.

## 2025-05-29 NOTE — PATIENT INSTRUCTIONS
Obesity, Adult  Obesity is the condition of having too much total body fat. Being overweight or obese means that your weight is greater than what is considered healthy for your body size. Obesity is determined by a measurement called BMI (body mass index). BMI is an estimate of body fat and is calculated from height and weight. For adults, a BMI of 30 or higher is considered obese.  Obesity can lead to other health concerns and major illnesses, including:  Stroke.  Coronary artery disease (CAD).  Type 2 diabetes.  Some types of cancer, including cancers of the colon, breast, uterus, and gallbladder.  High blood pressure (hypertension).  High cholesterol.  Gallbladder stones.  Obesity can also contribute to:  Osteoarthritis.  Sleep apnea.  Infertility problems.  What are the causes?  Common causes of this condition include:  Eating daily meals that are high in calories, sugar, and fat.  Drinking high amounts of sugar-sweetened beverages, such as soft drinks.  Being born with genes that may make you more likely to become obese.  Having a medical condition that causes obesity, including:  Hypothyroidism.  Polycystic ovarian syndrome (PCOS).  Binge-eating disorder.  Cushing syndrome.  Taking certain medicines, such as steroids, antidepressants, and seizure medicines.  Not being physically active (sedentary lifestyle).  Not getting enough sleep.  What increases the risk?  The following factors may make you more likely to develop this condition:  Having a family history of obesity.  Living in an area with limited access to:  [place], recreation centers, or sidewalks.  Healthy food choices, such as grocery stores and [employer] markets.  What are the signs or symptoms?  The main sign of this condition is having too much body fat.  How is this diagnosed?  This condition is diagnosed based on:  Your BMI. If you are an adult with a BMI of 30 or higher, you are considered obese.  Your waist circumference. This measures the  distance around your waistline.  Your skinfold thickness. Your health care provider may gently pinch a fold of your skin and measure it.  You may have other tests to check for underlying conditions.  How is this treated?  Treatment for this condition often includes changing your lifestyle. Treatment may include some or all of the following:  Dietary changes. This may include developing a healthy meal plan.  Regular physical activity. This may include activity that causes your heart to beat faster (aerobic exercise) and strength training. Work with your health care provider to design an exercise program that works for you.  Medicine to help you lose weight if you are unable to lose one pound a week after six weeks of healthy eating and more physical activity.  Treating conditions that cause the obesity (underlying conditions).  Surgery. Surgical options may include gastric banding and gastric bypass. Surgery may be done if:  Other treatments have not helped to improve your condition.  You have a BMI of 40 or higher.  You have life-threatening health problems related to obesity.  Follow these instructions at home:  Eating and drinking    Follow recommendations from your health care provider about what you eat and drink. Your health care provider may advise you to:  Limit fast food, sweets, and processed snack foods.  Choose low-fat options, such as low-fat milk instead of whole milk.  Eat five or more servings of fruits or vegetables every day.  Choose healthy foods when you eat out.  Keep low-fat snacks available.  Limit sugary drinks, such as soda, fruit juice, sweetened iced tea, and flavored milk.  Drink enough water to keep your urine pale yellow.  Do not follow a fad diet. Fad diets can be unhealthy and even dangerous.  Other healthful choices include:  Eat at home more often. This gives you more control over what you eat.  Learn to read food labels. This will help you understand how much food is considered one  serving.  Learn what a healthy serving size is.  Physical activity  Exercise regularly, as told by your health care provider.  Most adults should get up to 150 minutes of moderate-intensity exercise every week.  Ask your health care provider what types of exercise are safe for you and how often you should exercise.  Warm up and stretch before being active.  Cool down and stretch after being active.  Rest between periods of activity.  Lifestyle  Work with your health care provider and a dietitian to set a weight-loss goal that is healthy and reasonable for you.  Limit your screen time.  Find ways to reward yourself that do not involve food.  Do not drink alcohol if:  Your health care provider tells you not to drink.  You are pregnant, may be pregnant, or are planning to become pregnant.  If you drink alcohol:  Limit how much you have to:  0-1 drink a day for women.  0-2 drinks a day for men.  Know how much alcohol is in your drink. In the [place] one drink equals one 12 oz bottle of beer (355 mL), one 5 oz glass of wine (148 mL), or one 1½ oz glass of hard liquor (44 mL).  General instructions  Keep a weight-loss journal to keep track of the food you eat and how much exercise you get.  Take over-the-counter and prescription medicines only as told by your health care provider.  Take vitamins and supplements only as told by your health care provider.  Consider joining a support group. Your health care provider may be able to recommend a support group.  Pay attention to your mental health as obesity can lead to depression or self esteem issues.  Keep all follow-up visits. This is important.  Contact a health care provider if:  You are unable to meet your weight-loss goal after six weeks of dietary and lifestyle changes.  You have trouble breathing.  Summary  Obesity is the condition of having too much total body fat.  Being overweight or obese means that your weight is greater than what is considered healthy for your body  size.  Work with your health care provider and a dietitian to set a weight-loss goal that is healthy and reasonable for you.  Exercise regularly, as told by your health care provider. Ask your health care provider what types of exercise are safe for you and how often you should exercise.  This information is not intended to replace advice given to you by your health care provider. Make sure you discuss any questions you have with your health care provider.  Document Revised: 07/26/2022 Document Reviewed: 07/26/2022  Elsevier Patient Education © 2024 [employer]

## 2025-06-16 ENCOUNTER — TELEPHONE (OUTPATIENT)
Age: 20
End: 2025-06-16
Payer: COMMERCIAL

## 2025-06-16 NOTE — TELEPHONE ENCOUNTER
Trelegy Ellipta 100-62.5-25MCG/ACT aerosol powder  Key: [id number]     The request has been approved. The authorization is effective from 06/16/2025 to 06/16/2026, as long as the member is enrolled in their current health plan.

## 2025-06-16 NOTE — TELEPHONE ENCOUNTER
"    Caller: Polo Mcguire \"Richard\"    Relationship to patient: Self      Best call back number: [phone]     Provider: LUZMA     Medication PA needed: TRELEGY    Reason for call/Prior Auth: INSURANCE NEEDS A PA FOR THIS MEDICATION         "

## 2025-08-29 ENCOUNTER — LAB (OUTPATIENT)
Age: 20
End: 2025-08-29
Payer: COMMERCIAL

## 2025-08-29 ENCOUNTER — OFFICE VISIT (OUTPATIENT)
Age: 20
End: 2025-08-29
Payer: COMMERCIAL

## 2025-08-29 VITALS
BODY MASS INDEX: 32.5 KG/M2 | WEIGHT: 227 LBS | HEIGHT: 70 IN | DIASTOLIC BLOOD PRESSURE: 74 MMHG | OXYGEN SATURATION: 99 % | HEART RATE: 80 BPM | SYSTOLIC BLOOD PRESSURE: 112 MMHG

## 2025-08-29 DIAGNOSIS — E66.811 CLASS 1 OBESITY DUE TO EXCESS CALORIES WITH SERIOUS COMORBIDITY AND BODY MASS INDEX (BMI) OF 32.0 TO 32.9 IN ADULT: ICD-10-CM

## 2025-08-29 DIAGNOSIS — J45.40 MODERATE PERSISTENT ASTHMA WITHOUT COMPLICATION: Primary | ICD-10-CM

## 2025-08-29 DIAGNOSIS — Z00.00 WELLNESS EXAMINATION: ICD-10-CM

## 2025-08-29 DIAGNOSIS — I10 PRIMARY HYPERTENSION: ICD-10-CM

## 2025-08-29 DIAGNOSIS — Z13.0 SCREENING FOR DEFICIENCY ANEMIA: ICD-10-CM

## 2025-08-29 DIAGNOSIS — D64.9 ANEMIA, UNSPECIFIED TYPE: ICD-10-CM

## 2025-08-29 DIAGNOSIS — E66.09 CLASS 1 OBESITY DUE TO EXCESS CALORIES WITH SERIOUS COMORBIDITY AND BODY MASS INDEX (BMI) OF 32.0 TO 32.9 IN ADULT: ICD-10-CM

## 2025-08-29 DIAGNOSIS — F41.8 ANXIETY WITH DEPRESSION: ICD-10-CM

## 2025-08-29 DIAGNOSIS — Z13.29 SCREENING FOR THYROID DISORDER: ICD-10-CM

## 2025-08-29 DIAGNOSIS — Z13.220 SCREENING FOR LIPID DISORDERS: ICD-10-CM

## 2025-08-29 DIAGNOSIS — E55.9 VITAMIN D DEFICIENCY: ICD-10-CM

## 2025-08-29 DIAGNOSIS — Z13.1 SCREENING FOR DIABETES MELLITUS: ICD-10-CM

## 2025-08-29 LAB
25(OH)D3 SERPL-MCNC: 16.6 NG/ML (ref 30–100)
ALBUMIN SERPL-MCNC: 4.6 G/DL (ref 3.5–5.2)
ALBUMIN/GLOB SERPL: 1.5 G/DL
ALP SERPL-CCNC: 81 U/L (ref 39–117)
ALT SERPL W P-5'-P-CCNC: 22 U/L (ref 1–41)
ANION GAP SERPL CALCULATED.3IONS-SCNC: 13 MMOL/L (ref 5–15)
AST SERPL-CCNC: 18 U/L (ref 1–40)
BILIRUB SERPL-MCNC: 0.6 MG/DL (ref 0–1.2)
BUN SERPL-MCNC: 12 MG/DL (ref 6–20)
BUN/CREAT SERPL: 13.3 (ref 7–25)
CALCIUM SPEC-SCNC: 9.4 MG/DL (ref 8.6–10.5)
CHLORIDE SERPL-SCNC: 103 MMOL/L (ref 98–107)
CHOLEST SERPL-MCNC: 163 MG/DL (ref 0–200)
CO2 SERPL-SCNC: 24 MMOL/L (ref 22–29)
CREAT SERPL-MCNC: 0.9 MG/DL (ref 0.76–1.27)
DEPRECATED RDW RBC AUTO: 40.7 FL (ref 37–54)
EGFRCR SERPLBLD CKD-EPI 2021: 124.6 ML/MIN/1.73
ERYTHROCYTE [DISTWIDTH] IN BLOOD BY AUTOMATED COUNT: 12.6 % (ref 12.3–15.4)
GLOBULIN UR ELPH-MCNC: 3.1 GM/DL
GLUCOSE SERPL-MCNC: 88 MG/DL (ref 65–99)
HCT VFR BLD AUTO: 49.9 % (ref 37.5–51)
HDLC SERPL-MCNC: 47 MG/DL (ref 40–60)
HGB BLD-MCNC: 16.8 G/DL (ref 13–17.7)
IRON 24H UR-MRATE: 67 MCG/DL (ref 59–158)
IRON SATN MFR SERPL: 15 % (ref 20–50)
LDLC SERPL CALC-MCNC: 96 MG/DL (ref 0–100)
LDLC/HDLC SERPL: 1.99 {RATIO}
MCH RBC QN AUTO: 29.8 PG (ref 26.6–33)
MCHC RBC AUTO-ENTMCNC: 33.7 G/DL (ref 31.5–35.7)
MCV RBC AUTO: 88.6 FL (ref 79–97)
PLATELET # BLD AUTO: 252 10*3/MM3 (ref 140–450)
PMV BLD AUTO: 9.5 FL (ref 6–12)
POTASSIUM SERPL-SCNC: 4.3 MMOL/L (ref 3.5–5.2)
PROT SERPL-MCNC: 7.7 G/DL (ref 6–8.5)
RBC # BLD AUTO: 5.63 10*6/MM3 (ref 4.14–5.8)
SODIUM SERPL-SCNC: 140 MMOL/L (ref 136–145)
TIBC SERPL-MCNC: 434 MCG/DL (ref 298–536)
TRANSFERRIN SERPL-MCNC: 291 MG/DL (ref 200–360)
TRIGL SERPL-MCNC: 113 MG/DL (ref 0–150)
TSH SERPL DL<=0.05 MIU/L-ACNC: 5.07 UIU/ML (ref 0.27–4.2)
VLDLC SERPL-MCNC: 20 MG/DL (ref 5–40)
WBC NRBC COR # BLD AUTO: 7.85 10*3/MM3 (ref 3.4–10.8)

## 2025-08-29 PROCEDURE — 84466 ASSAY OF TRANSFERRIN: CPT | Performed by: NURSE PRACTITIONER

## 2025-08-29 PROCEDURE — 3074F SYST BP LT 130 MM HG: CPT | Performed by: NURSE PRACTITIONER

## 2025-08-29 PROCEDURE — 80061 LIPID PANEL: CPT | Performed by: NURSE PRACTITIONER

## 2025-08-29 PROCEDURE — 80050 GENERAL HEALTH PANEL: CPT | Performed by: NURSE PRACTITIONER

## 2025-08-29 PROCEDURE — 1160F RVW MEDS BY RX/DR IN RCRD: CPT | Performed by: NURSE PRACTITIONER

## 2025-08-29 PROCEDURE — 99214 OFFICE O/P EST MOD 30 MIN: CPT | Performed by: NURSE PRACTITIONER

## 2025-08-29 PROCEDURE — 3078F DIAST BP <80 MM HG: CPT | Performed by: NURSE PRACTITIONER

## 2025-08-29 PROCEDURE — 84439 ASSAY OF FREE THYROXINE: CPT | Performed by: NURSE PRACTITIONER

## 2025-08-29 PROCEDURE — 82306 VITAMIN D 25 HYDROXY: CPT | Performed by: NURSE PRACTITIONER

## 2025-08-29 PROCEDURE — 1159F MED LIST DOCD IN RCRD: CPT | Performed by: NURSE PRACTITIONER

## 2025-08-29 PROCEDURE — 83540 ASSAY OF IRON: CPT | Performed by: NURSE PRACTITIONER

## 2025-08-30 LAB — T4 FREE SERPL-MCNC: 1.29 NG/DL (ref 0.92–1.68)
